# Patient Record
Sex: MALE | Race: WHITE | NOT HISPANIC OR LATINO | Employment: PART TIME | URBAN - METROPOLITAN AREA
[De-identification: names, ages, dates, MRNs, and addresses within clinical notes are randomized per-mention and may not be internally consistent; named-entity substitution may affect disease eponyms.]

---

## 2023-03-14 LAB
ALANINE AMINOTRANSFERASE (SGPT) (U/L) IN SER/PLAS: 34 U/L (ref 10–52)
ALBUMIN (G/DL) IN SER/PLAS: 4.2 G/DL (ref 3.4–5)
ALKALINE PHOSPHATASE (U/L) IN SER/PLAS: 68 U/L (ref 33–136)
ANION GAP IN SER/PLAS: 10 MMOL/L (ref 10–20)
ASPARTATE AMINOTRANSFERASE (SGOT) (U/L) IN SER/PLAS: 24 U/L (ref 9–39)
BASOPHILS (10*3/UL) IN BLOOD BY AUTOMATED COUNT: 0.04 X10E9/L (ref 0–0.1)
BASOPHILS/100 LEUKOCYTES IN BLOOD BY AUTOMATED COUNT: 0.6 % (ref 0–2)
BILIRUBIN TOTAL (MG/DL) IN SER/PLAS: 0.9 MG/DL (ref 0–1.2)
CALCIUM (MG/DL) IN SER/PLAS: 9 MG/DL (ref 8.6–10.3)
CARBON DIOXIDE, TOTAL (MMOL/L) IN SER/PLAS: 29 MMOL/L (ref 21–32)
CHLORIDE (MMOL/L) IN SER/PLAS: 99 MMOL/L (ref 98–107)
CREATININE (MG/DL) IN SER/PLAS: 1.05 MG/DL (ref 0.5–1.3)
EOSINOPHILS (10*3/UL) IN BLOOD BY AUTOMATED COUNT: 0.09 X10E9/L (ref 0–0.7)
EOSINOPHILS/100 LEUKOCYTES IN BLOOD BY AUTOMATED COUNT: 1.3 % (ref 0–6)
ERYTHROCYTE DISTRIBUTION WIDTH (RATIO) BY AUTOMATED COUNT: 14.4 % (ref 11.5–14.5)
ERYTHROCYTE MEAN CORPUSCULAR HEMOGLOBIN CONCENTRATION (G/DL) BY AUTOMATED: 31.3 G/DL (ref 32–36)
ERYTHROCYTE MEAN CORPUSCULAR VOLUME (FL) BY AUTOMATED COUNT: 96 FL (ref 80–100)
ERYTHROCYTES (10*6/UL) IN BLOOD BY AUTOMATED COUNT: 3.78 X10E12/L (ref 4.5–5.9)
GFR MALE: 78 ML/MIN/1.73M2
GLUCOSE (MG/DL) IN SER/PLAS: 93 MG/DL (ref 74–99)
HEMATOCRIT (%) IN BLOOD BY AUTOMATED COUNT: 36.4 % (ref 41–52)
HEMOGLOBIN (G/DL) IN BLOOD: 11.4 G/DL (ref 13.5–17.5)
IMMATURE GRANULOCYTES/100 LEUKOCYTES IN BLOOD BY AUTOMATED COUNT: 0.4 % (ref 0–0.9)
LEUKOCYTES (10*3/UL) IN BLOOD BY AUTOMATED COUNT: 7 X10E9/L (ref 4.4–11.3)
LYMPHOCYTES (10*3/UL) IN BLOOD BY AUTOMATED COUNT: 1.35 X10E9/L (ref 1.2–4.8)
LYMPHOCYTES/100 LEUKOCYTES IN BLOOD BY AUTOMATED COUNT: 19.4 % (ref 13–44)
MONOCYTES (10*3/UL) IN BLOOD BY AUTOMATED COUNT: 0.74 X10E9/L (ref 0.1–1)
MONOCYTES/100 LEUKOCYTES IN BLOOD BY AUTOMATED COUNT: 10.6 % (ref 2–10)
NEUTROPHILS (10*3/UL) IN BLOOD BY AUTOMATED COUNT: 4.72 X10E9/L (ref 1.2–7.7)
NEUTROPHILS/100 LEUKOCYTES IN BLOOD BY AUTOMATED COUNT: 67.7 % (ref 40–80)
PLATELETS (10*3/UL) IN BLOOD AUTOMATED COUNT: 213 X10E9/L (ref 150–450)
POTASSIUM (MMOL/L) IN SER/PLAS: 4.7 MMOL/L (ref 3.5–5.3)
PROTEIN TOTAL: 6.9 G/DL (ref 6.4–8.2)
SODIUM (MMOL/L) IN SER/PLAS: 133 MMOL/L (ref 136–145)
UREA NITROGEN (MG/DL) IN SER/PLAS: 19 MG/DL (ref 6–23)

## 2023-04-14 DIAGNOSIS — I10 PRIMARY HYPERTENSION: Primary | ICD-10-CM

## 2023-04-14 RX ORDER — LISINOPRIL 20 MG/1
20 TABLET ORAL DAILY
Qty: 90 TABLET | Refills: 0 | Status: SHIPPED | OUTPATIENT
Start: 2023-04-14

## 2023-04-14 RX ORDER — LISINOPRIL 20 MG/1
1 TABLET ORAL DAILY
COMMUNITY
Start: 2018-10-18 | End: 2023-04-14 | Stop reason: SDUPTHER

## 2023-06-23 LAB
ALANINE AMINOTRANSFERASE (SGPT) (U/L) IN SER/PLAS: 23 U/L (ref 10–52)
ALBUMIN (G/DL) IN SER/PLAS: 4.3 G/DL (ref 3.4–5)
ALKALINE PHOSPHATASE (U/L) IN SER/PLAS: 60 U/L (ref 33–136)
ANION GAP IN SER/PLAS: 12 MMOL/L (ref 10–20)
ASPARTATE AMINOTRANSFERASE (SGOT) (U/L) IN SER/PLAS: 23 U/L (ref 9–39)
BASOPHILS (10*3/UL) IN BLOOD BY AUTOMATED COUNT: 0.05 X10E9/L (ref 0–0.1)
BASOPHILS/100 LEUKOCYTES IN BLOOD BY AUTOMATED COUNT: 0.7 % (ref 0–2)
BILIRUBIN TOTAL (MG/DL) IN SER/PLAS: 0.9 MG/DL (ref 0–1.2)
CALCIUM (MG/DL) IN SER/PLAS: 8.9 MG/DL (ref 8.6–10.3)
CARBON DIOXIDE, TOTAL (MMOL/L) IN SER/PLAS: 24 MMOL/L (ref 21–32)
CHLORIDE (MMOL/L) IN SER/PLAS: 102 MMOL/L (ref 98–107)
CREATININE (MG/DL) IN SER/PLAS: 0.92 MG/DL (ref 0.5–1.3)
EOSINOPHILS (10*3/UL) IN BLOOD BY AUTOMATED COUNT: 0.16 X10E9/L (ref 0–0.7)
EOSINOPHILS/100 LEUKOCYTES IN BLOOD BY AUTOMATED COUNT: 2.4 % (ref 0–6)
ERYTHROCYTE DISTRIBUTION WIDTH (RATIO) BY AUTOMATED COUNT: 13.6 % (ref 11.5–14.5)
ERYTHROCYTE MEAN CORPUSCULAR HEMOGLOBIN CONCENTRATION (G/DL) BY AUTOMATED: 31 G/DL (ref 32–36)
ERYTHROCYTE MEAN CORPUSCULAR VOLUME (FL) BY AUTOMATED COUNT: 97 FL (ref 80–100)
ERYTHROCYTES (10*6/UL) IN BLOOD BY AUTOMATED COUNT: 3.78 X10E12/L (ref 4.5–5.9)
GFR MALE: >90 ML/MIN/1.73M2
GLUCOSE (MG/DL) IN SER/PLAS: 87 MG/DL (ref 74–99)
HEMATOCRIT (%) IN BLOOD BY AUTOMATED COUNT: 36.8 % (ref 41–52)
HEMOGLOBIN (G/DL) IN BLOOD: 11.4 G/DL (ref 13.5–17.5)
IMMATURE GRANULOCYTES/100 LEUKOCYTES IN BLOOD BY AUTOMATED COUNT: 0.3 % (ref 0–0.9)
LEUKOCYTES (10*3/UL) IN BLOOD BY AUTOMATED COUNT: 6.7 X10E9/L (ref 4.4–11.3)
LYMPHOCYTES (10*3/UL) IN BLOOD BY AUTOMATED COUNT: 1.47 X10E9/L (ref 1.2–4.8)
LYMPHOCYTES/100 LEUKOCYTES IN BLOOD BY AUTOMATED COUNT: 21.9 % (ref 13–44)
MONOCYTES (10*3/UL) IN BLOOD BY AUTOMATED COUNT: 0.64 X10E9/L (ref 0.1–1)
MONOCYTES/100 LEUKOCYTES IN BLOOD BY AUTOMATED COUNT: 9.6 % (ref 2–10)
NEUTROPHILS (10*3/UL) IN BLOOD BY AUTOMATED COUNT: 4.36 X10E9/L (ref 1.2–7.7)
NEUTROPHILS/100 LEUKOCYTES IN BLOOD BY AUTOMATED COUNT: 65.1 % (ref 40–80)
PLATELETS (10*3/UL) IN BLOOD AUTOMATED COUNT: 201 X10E9/L (ref 150–450)
POTASSIUM (MMOL/L) IN SER/PLAS: 4.3 MMOL/L (ref 3.5–5.3)
PROTEIN TOTAL: 6.9 G/DL (ref 6.4–8.2)
SODIUM (MMOL/L) IN SER/PLAS: 134 MMOL/L (ref 136–145)
UREA NITROGEN (MG/DL) IN SER/PLAS: 20 MG/DL (ref 6–23)

## 2023-07-07 DIAGNOSIS — J30.9 ALLERGIC RHINITIS, UNSPECIFIED SEASONALITY, UNSPECIFIED TRIGGER: Primary | ICD-10-CM

## 2023-07-07 RX ORDER — FLUTICASONE PROPIONATE 50 MCG
1 SPRAY, SUSPENSION (ML) NASAL 2 TIMES DAILY
COMMUNITY
End: 2023-07-07 | Stop reason: SDUPTHER

## 2023-07-07 RX ORDER — FLUTICASONE PROPIONATE 50 MCG
1 SPRAY, SUSPENSION (ML) NASAL 2 TIMES DAILY
Qty: 16 G | Refills: 2 | Status: SHIPPED | OUTPATIENT
Start: 2023-07-07

## 2023-08-20 PROBLEM — G25.0 ESSENTIAL TREMOR: Status: ACTIVE | Noted: 2023-08-20

## 2023-08-20 PROBLEM — H25.13 NUCLEAR SCLEROSIS OF BOTH EYES: Status: ACTIVE | Noted: 2023-08-20

## 2023-08-20 PROBLEM — S81.819A LEG LACERATION: Status: ACTIVE | Noted: 2023-08-20

## 2023-08-20 PROBLEM — H04.123 DRY EYE SYNDROME OF BOTH EYES: Status: ACTIVE | Noted: 2023-08-20

## 2023-08-20 PROBLEM — H17.9 CORNEAL SCAR, LEFT EYE: Status: ACTIVE | Noted: 2023-08-20

## 2023-08-20 PROBLEM — I71.20 THORACIC AORTIC ANEURYSM WITHOUT RUPTURE (CMS-HCC): Status: ACTIVE | Noted: 2023-08-20

## 2023-08-20 PROBLEM — L98.9 DISORDER OF SKIN AND SUBCUTANEOUS TISSUE: Status: ACTIVE | Noted: 2023-08-20

## 2023-08-20 PROBLEM — M79.89 LEG SWELLING: Status: ACTIVE | Noted: 2023-08-20

## 2023-08-20 PROBLEM — H11.823 CONJUNCTIVOCHALASIS OF BOTH EYES: Status: ACTIVE | Noted: 2023-08-20

## 2023-08-20 PROBLEM — H93.13 TINNITUS OF BOTH EARS: Status: ACTIVE | Noted: 2023-08-20

## 2023-08-20 PROBLEM — H35.363 DRUSEN STAGE MACULAR DEGENERATION OF BOTH EYES: Status: ACTIVE | Noted: 2023-08-20

## 2023-08-20 PROBLEM — H01.009 BLEPHARITIS OF BOTH UPPER AND LOWER EYELID: Status: ACTIVE | Noted: 2023-08-20

## 2023-08-20 PROBLEM — L90.5 SCAR: Status: ACTIVE | Noted: 2023-08-20

## 2023-08-20 PROBLEM — U07.1 COVID-19: Status: ACTIVE | Noted: 2023-08-20

## 2023-08-20 PROBLEM — R35.0 URINARY FREQUENCY: Status: ACTIVE | Noted: 2023-08-20

## 2023-08-20 PROBLEM — S81.801A TRAUMATIC OPEN WOUND OF RIGHT LOWER LEG: Status: ACTIVE | Noted: 2023-08-20

## 2023-08-20 PROBLEM — N13.8 BPH WITH OBSTRUCTION/LOWER URINARY TRACT SYMPTOMS: Status: ACTIVE | Noted: 2023-08-20

## 2023-08-20 PROBLEM — R91.1 LUNG NODULE: Status: ACTIVE | Noted: 2023-08-20

## 2023-08-20 PROBLEM — N40.1 BPH WITH OBSTRUCTION/LOWER URINARY TRACT SYMPTOMS: Status: ACTIVE | Noted: 2023-08-20

## 2023-08-20 PROBLEM — L12.1: Status: ACTIVE | Noted: 2023-08-20

## 2023-08-20 PROBLEM — D64.9 ANEMIA: Status: ACTIVE | Noted: 2023-08-20

## 2023-08-20 PROBLEM — R30.0 DYSURIA: Status: ACTIVE | Noted: 2023-08-20

## 2023-08-20 PROBLEM — H35.371 EPIRETINAL MEMBRANE (ERM) OF RIGHT EYE: Status: ACTIVE | Noted: 2023-08-20

## 2023-08-20 PROBLEM — E78.5 HYPERLIPIDEMIA: Status: ACTIVE | Noted: 2023-08-20

## 2023-08-20 PROBLEM — D35.00 ADRENAL ADENOMA: Status: ACTIVE | Noted: 2023-08-20

## 2023-08-20 PROBLEM — I10 HYPERTENSION: Status: ACTIVE | Noted: 2023-08-20

## 2023-08-20 PROBLEM — I25.10 ARTERIOSCLEROSIS OF CORONARY ARTERY: Status: ACTIVE | Noted: 2023-08-20

## 2023-08-20 PROBLEM — R93.1 AGATSTON CORONARY ARTERY CALCIUM SCORE GREATER THAN 400: Status: ACTIVE | Noted: 2023-08-20
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
## 2023-08-20 PROBLEM — H93.90 EAR LESION: Status: ACTIVE | Noted: 2023-08-20

## 2023-08-20 PROBLEM — D12.6 TUBULAR ADENOMA OF COLON: Status: ACTIVE | Noted: 2023-08-20

## 2023-08-20 PROBLEM — N41.9 PROSTATITIS: Status: ACTIVE | Noted: 2023-08-20

## 2023-08-20 PROBLEM — N52.9 ED (ERECTILE DYSFUNCTION): Status: ACTIVE | Noted: 2023-08-20

## 2023-08-20 RX ORDER — NIRMATRELVIR AND RITONAVIR 300-100 MG
KIT ORAL
COMMUNITY
Start: 2023-02-02 | End: 2023-10-20 | Stop reason: ALTCHOICE

## 2023-08-20 RX ORDER — MULTIVITAMIN
TABLET ORAL
COMMUNITY
Start: 2013-12-10

## 2023-08-20 RX ORDER — BETAMETHASONE DIPROPIONATE 0.5 MG/G
CREAM TOPICAL
COMMUNITY
Start: 2022-11-21

## 2023-08-20 RX ORDER — TADALAFIL 20 MG/1
20 TABLET ORAL
COMMUNITY
Start: 2023-01-19

## 2023-08-20 RX ORDER — PROPRANOLOL HYDROCHLORIDE 20 MG/1
TABLET ORAL
COMMUNITY
Start: 2022-06-27 | End: 2023-10-02 | Stop reason: SDUPTHER

## 2023-08-20 RX ORDER — CLINDAMYCIN PHOSPHATE 10 UG/ML
LOTION TOPICAL
COMMUNITY
Start: 2022-09-09

## 2023-08-20 RX ORDER — CIPROFLOXACIN 500 MG/1
500 TABLET ORAL EVERY 12 HOURS
COMMUNITY
Start: 2022-11-02 | End: 2023-10-20 | Stop reason: ALTCHOICE

## 2023-08-20 RX ORDER — CLOBETASOL PROPIONATE 0.05 MG/G
GEL TOPICAL
COMMUNITY
Start: 2023-07-06

## 2023-08-20 RX ORDER — ATORVASTATIN CALCIUM 40 MG/1
1 TABLET, FILM COATED ORAL NIGHTLY
COMMUNITY
Start: 2018-02-01 | End: 2024-05-31 | Stop reason: SDUPTHER

## 2023-08-20 RX ORDER — TRIAMCINOLONE ACETONIDE 1 MG/G
OINTMENT TOPICAL
COMMUNITY
Start: 2023-01-03

## 2023-08-20 RX ORDER — DAPSONE 100 MG/1
1 TABLET ORAL DAILY
COMMUNITY
Start: 2022-06-01

## 2023-08-20 RX ORDER — CHLORHEXIDINE GLUCONATE ORAL RINSE 1.2 MG/ML
SOLUTION DENTAL
COMMUNITY
Start: 2023-01-03

## 2023-08-20 RX ORDER — SILDENAFIL CITRATE 20 MG/1
TABLET ORAL
COMMUNITY
Start: 2021-07-07 | End: 2023-10-20 | Stop reason: ALTCHOICE

## 2023-08-20 RX ORDER — CHLORHEXIDINE GLUCONATE 4 G/100ML
SOLUTION TOPICAL
COMMUNITY
Start: 2023-01-03

## 2023-08-20 RX ORDER — TADALAFIL 5 MG/1
1 TABLET ORAL DAILY
COMMUNITY
Start: 2023-01-04 | End: 2023-10-20 | Stop reason: SDUPTHER

## 2023-08-20 RX ORDER — DAPSONE 25 MG/1
3 TABLET ORAL DAILY
COMMUNITY
Start: 2023-01-31

## 2023-09-25 LAB
ALANINE AMINOTRANSFERASE (SGPT) (U/L) IN SER/PLAS: 17 U/L (ref 10–52)
ALBUMIN (G/DL) IN SER/PLAS: 4 G/DL (ref 3.4–5)
ALKALINE PHOSPHATASE (U/L) IN SER/PLAS: 55 U/L (ref 33–136)
ANION GAP IN SER/PLAS: 10 MMOL/L (ref 10–20)
ASPARTATE AMINOTRANSFERASE (SGOT) (U/L) IN SER/PLAS: 20 U/L (ref 9–39)
BASOPHILS (10*3/UL) IN BLOOD BY AUTOMATED COUNT: 0.06 X10E9/L (ref 0–0.1)
BASOPHILS/100 LEUKOCYTES IN BLOOD BY AUTOMATED COUNT: 0.9 % (ref 0–2)
BILIRUBIN TOTAL (MG/DL) IN SER/PLAS: 0.9 MG/DL (ref 0–1.2)
CALCIUM (MG/DL) IN SER/PLAS: 8.8 MG/DL (ref 8.6–10.3)
CARBON DIOXIDE, TOTAL (MMOL/L) IN SER/PLAS: 29 MMOL/L (ref 21–32)
CHLORIDE (MMOL/L) IN SER/PLAS: 99 MMOL/L (ref 98–107)
CREATININE (MG/DL) IN SER/PLAS: 1.02 MG/DL (ref 0.5–1.3)
EOSINOPHILS (10*3/UL) IN BLOOD BY AUTOMATED COUNT: 0.16 X10E9/L (ref 0–0.7)
EOSINOPHILS/100 LEUKOCYTES IN BLOOD BY AUTOMATED COUNT: 2.3 % (ref 0–6)
ERYTHROCYTE DISTRIBUTION WIDTH (RATIO) BY AUTOMATED COUNT: 13 % (ref 11.5–14.5)
ERYTHROCYTE MEAN CORPUSCULAR HEMOGLOBIN CONCENTRATION (G/DL) BY AUTOMATED: 32.1 G/DL (ref 32–36)
ERYTHROCYTE MEAN CORPUSCULAR VOLUME (FL) BY AUTOMATED COUNT: 97 FL (ref 80–100)
ERYTHROCYTES (10*6/UL) IN BLOOD BY AUTOMATED COUNT: 3.73 X10E12/L (ref 4.5–5.9)
GFR MALE: 81 ML/MIN/1.73M2
GLUCOSE (MG/DL) IN SER/PLAS: 119 MG/DL (ref 74–99)
HEMATOCRIT (%) IN BLOOD BY AUTOMATED COUNT: 36.1 % (ref 41–52)
HEMOGLOBIN (G/DL) IN BLOOD: 11.6 G/DL (ref 13.5–17.5)
IMMATURE GRANULOCYTES/100 LEUKOCYTES IN BLOOD BY AUTOMATED COUNT: 0.3 % (ref 0–0.9)
LEUKOCYTES (10*3/UL) IN BLOOD BY AUTOMATED COUNT: 6.9 X10E9/L (ref 4.4–11.3)
LYMPHOCYTES (10*3/UL) IN BLOOD BY AUTOMATED COUNT: 1.23 X10E9/L (ref 1.2–4.8)
LYMPHOCYTES/100 LEUKOCYTES IN BLOOD BY AUTOMATED COUNT: 17.9 % (ref 13–44)
MONOCYTES (10*3/UL) IN BLOOD BY AUTOMATED COUNT: 0.45 X10E9/L (ref 0.1–1)
MONOCYTES/100 LEUKOCYTES IN BLOOD BY AUTOMATED COUNT: 6.5 % (ref 2–10)
NEUTROPHILS (10*3/UL) IN BLOOD BY AUTOMATED COUNT: 4.96 X10E9/L (ref 1.2–7.7)
NEUTROPHILS/100 LEUKOCYTES IN BLOOD BY AUTOMATED COUNT: 72.1 % (ref 40–80)
PLATELETS (10*3/UL) IN BLOOD AUTOMATED COUNT: 205 X10E9/L (ref 150–450)
POTASSIUM (MMOL/L) IN SER/PLAS: 4.6 MMOL/L (ref 3.5–5.3)
PROSTATE SPECIFIC AG (NG/ML) IN SER/PLAS: 1.1 NG/ML (ref 0–4)
PROTEIN TOTAL: 6.5 G/DL (ref 6.4–8.2)
SODIUM (MMOL/L) IN SER/PLAS: 133 MMOL/L (ref 136–145)
UREA NITROGEN (MG/DL) IN SER/PLAS: 24 MG/DL (ref 6–23)

## 2023-10-02 ENCOUNTER — OFFICE VISIT (OUTPATIENT)
Dept: NEUROLOGY | Facility: CLINIC | Age: 68
End: 2023-10-02
Payer: MEDICARE

## 2023-10-02 VITALS
HEIGHT: 72 IN | BODY MASS INDEX: 28.31 KG/M2 | SYSTOLIC BLOOD PRESSURE: 128 MMHG | DIASTOLIC BLOOD PRESSURE: 74 MMHG | WEIGHT: 209 LBS

## 2023-10-02 DIAGNOSIS — G25.0 ESSENTIAL TREMOR: Primary | ICD-10-CM

## 2023-10-02 DIAGNOSIS — Z79.899 OTHER LONG TERM (CURRENT) DRUG THERAPY: ICD-10-CM

## 2023-10-02 DIAGNOSIS — L12.1 CICATRICIAL PEMPHIGOID (MULTI): Primary | ICD-10-CM

## 2023-10-02 PROCEDURE — 3074F SYST BP LT 130 MM HG: CPT | Performed by: PSYCHIATRY & NEUROLOGY

## 2023-10-02 PROCEDURE — 99213 OFFICE O/P EST LOW 20 MIN: CPT | Performed by: PSYCHIATRY & NEUROLOGY

## 2023-10-02 PROCEDURE — 1160F RVW MEDS BY RX/DR IN RCRD: CPT | Performed by: PSYCHIATRY & NEUROLOGY

## 2023-10-02 PROCEDURE — 1159F MED LIST DOCD IN RCRD: CPT | Performed by: PSYCHIATRY & NEUROLOGY

## 2023-10-02 PROCEDURE — 1126F AMNT PAIN NOTED NONE PRSNT: CPT | Performed by: PSYCHIATRY & NEUROLOGY

## 2023-10-02 PROCEDURE — 3078F DIAST BP <80 MM HG: CPT | Performed by: PSYCHIATRY & NEUROLOGY

## 2023-10-02 RX ORDER — PROPRANOLOL HYDROCHLORIDE 20 MG/1
40 TABLET ORAL 2 TIMES DAILY
Qty: 120 TABLET | Refills: 1 | Status: SHIPPED | OUTPATIENT
Start: 2023-10-02 | End: 2023-12-27 | Stop reason: SDUPTHER

## 2023-10-02 ASSESSMENT — FAHN TOLOSA MARTIN TREMOR (FTM)
TOUNGE AT REST: 0
LLE TOTAL SCORE: 0
UE FINGER TO NOSE AND OTHER ACTIONS: 1
LE TOE TO FINGER IN A FLEXED POSTURE: 0
LE AT REST: 0
DRAWING A RIGHT SCORE: 1
DRAWING C TOTAL SCORE: 3
TOUNGE TOTAL SCORE: 1
RLE TOTAL SCORE: 0
LE LEG FLEXED AT HIPS AND KNEES AT POSTURE: 0
LE AT REST: 0
LE LEG FLEXED AT HIPS AND KNEES AT POSTURE: 0
UE ARM AT REST: 0
UE ARMS OUTSTRECHED WRISTS MILDLY EXTENDED FINGERS SPREAD APART: 2
DRAWING C RIGHT SCORE: 1
DRAWING A TOTAL SCORE: 3
HANDWRITING WITH DOMINANT HAND: 1
DRAWING B RGHT SCORE: 2
VOICE TOTAL SCORE: 1
UE ARMS OUTSTRECHED WRISTS MILDLY EXTENDED FINGERS SPREAD APART: 0
RUE TOTAL SCORE: 1
FACE AT POSTURE WHEN STANDING OR SITTING: 0
UE FINGER TO NOSE AND OTHER ACTIONS: 2
FACE IN REPOSE: 0
TOUNGE WHEN PROTUDED: 1
UE ARM AT REST: 0
HEAD AT POSTURE WHEN STANDING OR SITTING: 0
RUE TOTAL SCORE: 4
DRAWING B TOTAL SCORE: 4
DRAWING C LEFT SCORE: 2
DRAWING A LEFT SCORE: 2
LE TOE TO FINGER IN A FLEXED POSTURE: 0
FACE TOTAL: 0
VOICE IN ACTION: 1
DRAWING B LEFT SCORE: 2

## 2023-10-02 ASSESSMENT — PAIN SCALES - GENERAL: PAINLEVEL: 0-NO PAIN

## 2023-10-02 NOTE — PATIENT INSTRUCTIONS
It was nice to see you today   You can take 60 mg of propranolol if needed for a performance, otherwise take 40 mg twice a day.   Side effects of propranolol include dizziness, reduced exercise tolerance, sleep disturbance.   Return to clinic in 9-12 months.

## 2023-10-02 NOTE — PROGRESS NOTES
Here for follow up    Subjective     Varghese Morales is a right handed  67 y.o. year old male who presents with Follow-up.   Visit type: follow up visit     HPI           Last seen a year ago. Was restarted on propranolol at that time.   For the most part her tremor is very good.   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   Mild shaking still present.   No side effects from the medications.   Overall he feels that he is doing good although he notes the propranolol was efficacious at lower doses in the past.      Relevant meds:   Propranolol 20 mg ta - two tabs twice a day   (Did take one extra tab when went on stage as descirbed above)     Review of Systems  All other system have been reviewed and are negative for complaint.  Objective   Neurological Exam    Physical Exam      Please see tremor rating scale   Mild kinetic temor L>R side.   NO parkinsonism, walks briskly, normal arm swing, normal facial mimickry and no hypophonia.                          Assessment/Plan   Diagnoses and all orders for this visit:  Essential tremor  -     propranolol (Inderal) 20 mg tablet; Take 2 tablets (40 mg) by mouth 2 times a day.    He is seen for follow up. His exam continues to show mild essential tremor.   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 (Max dose 60 mg per dose), NO side effects from current medications.   He was advised re side effects again.   He can return to clinic in 9-12 months.

## 2023-10-20 ENCOUNTER — OFFICE VISIT (OUTPATIENT)
Dept: UROLOGY | Facility: CLINIC | Age: 68
End: 2023-10-20
Payer: MEDICARE

## 2023-10-20 VITALS
WEIGHT: 222.2 LBS | DIASTOLIC BLOOD PRESSURE: 75 MMHG | SYSTOLIC BLOOD PRESSURE: 126 MMHG | BODY MASS INDEX: 30.14 KG/M2 | HEART RATE: 57 BPM | TEMPERATURE: 97.5 F

## 2023-10-20 DIAGNOSIS — N13.8 BPH WITH OBSTRUCTION/LOWER URINARY TRACT SYMPTOMS: ICD-10-CM

## 2023-10-20 DIAGNOSIS — N52.9 ERECTILE DISORDER: Primary | ICD-10-CM

## 2023-10-20 DIAGNOSIS — N40.1 BPH WITH OBSTRUCTION/LOWER URINARY TRACT SYMPTOMS: ICD-10-CM

## 2023-10-20 PROCEDURE — 99213 OFFICE O/P EST LOW 20 MIN: CPT | Performed by: NURSE PRACTITIONER

## 2023-10-20 PROCEDURE — 3074F SYST BP LT 130 MM HG: CPT | Performed by: NURSE PRACTITIONER

## 2023-10-20 PROCEDURE — 3078F DIAST BP <80 MM HG: CPT | Performed by: NURSE PRACTITIONER

## 2023-10-20 PROCEDURE — 1126F AMNT PAIN NOTED NONE PRSNT: CPT | Performed by: NURSE PRACTITIONER

## 2023-10-20 PROCEDURE — 1160F RVW MEDS BY RX/DR IN RCRD: CPT | Performed by: NURSE PRACTITIONER

## 2023-10-20 PROCEDURE — 1159F MED LIST DOCD IN RCRD: CPT | Performed by: NURSE PRACTITIONER

## 2023-10-20 PROCEDURE — 1036F TOBACCO NON-USER: CPT | Performed by: NURSE PRACTITIONER

## 2023-10-20 RX ORDER — TADALAFIL 5 MG/1
5 TABLET ORAL DAILY
Qty: 90 TABLET | Refills: 1 | Status: SHIPPED | OUTPATIENT
Start: 2023-10-20 | End: 2024-06-03 | Stop reason: WASHOUT

## 2023-10-20 NOTE — PROGRESS NOTES
Subjective   Patient ID: Varghese Morales is a 67 y.o. male who presents for PSA FOLLOW UP.  Presents for f/u of BPH and ED. He notes he feels this distally within the penis, mild irritation. He states this has become noticeable over the last few months.      PSA 1.25 in August 2022. Denies family history of breast cancer. Mother with breast cancer, sister with uterine cancer.      Taking tadalafil 20mg PO QOD. Currently taking tadalafil 20mg PO QOD. NTF down to 1-2 from 3-4. Urination has been easier with this also. He is pleased. No further urinary irritation. Still somewhat bothered by nocturia.      Increased difficulty with achieving orgasms. Happens with each attempt at orgasm. No partner at present.           Review of Systems   All other systems reviewed and are negative.      Objective   Physical Exam  Vitals reviewed.     Alert and oriented x3  Moist mucous membranes  Breathes easily on room air  Abdomen soft, nondistended. Obese  No edema  No scleral icterus  No focal neurological deficits  Appears stated age, no acute distress    Lab Results   Component Value Date    PSA 1.10 09/25/2023    PSA 1.40 07/27/2021    PSA 1.13 02/07/2020         Assessment/Plan   Diagnoses and all orders for this visit:  Erectile disorder  -     tadalafil (Cialis) 5 mg tablet; Take 1 tablet (5 mg) by mouth once daily.  BPH with obstruction/lower urinary tract symptoms  -     tadalafil (Cialis) 5 mg tablet; Take 1 tablet (5 mg) by mouth once daily.       Encouraged use of manual vacuum erection device. Reviewed PSA. Encouraged continued annual follow up

## 2023-10-27 ENCOUNTER — APPOINTMENT (OUTPATIENT)
Dept: UROLOGY | Facility: CLINIC | Age: 68
End: 2023-10-27
Payer: COMMERCIAL

## 2023-11-20 ENCOUNTER — TELEPHONE (OUTPATIENT)
Dept: PRIMARY CARE | Facility: CLINIC | Age: 68
End: 2023-11-20
Payer: MEDICARE

## 2023-11-20 NOTE — TELEPHONE ENCOUNTER
Patient is traveling tomorrow to his daughters house and she is on day 8 since she got COVID. She had a very mild case. Is this ok to go see her? Is she still contagious?

## 2023-12-27 ENCOUNTER — TELEPHONE (OUTPATIENT)
Dept: NEUROLOGY | Facility: CLINIC | Age: 68
End: 2023-12-27
Payer: MEDICARE

## 2023-12-27 DIAGNOSIS — G25.0 ESSENTIAL TREMOR: ICD-10-CM

## 2023-12-27 RX ORDER — PROPRANOLOL HYDROCHLORIDE 20 MG/1
40 TABLET ORAL 2 TIMES DAILY
Qty: 360 TABLET | Refills: 3 | Status: SHIPPED | OUTPATIENT
Start: 2023-12-27 | End: 2024-06-03 | Stop reason: SDUPTHER

## 2024-01-04 ENCOUNTER — LAB (OUTPATIENT)
Dept: LAB | Facility: LAB | Age: 69
End: 2024-01-04
Payer: MEDICARE

## 2024-01-04 DIAGNOSIS — L12.1 CICATRICIAL PEMPHIGOID (MULTI): ICD-10-CM

## 2024-01-04 DIAGNOSIS — Z79.899 OTHER LONG TERM (CURRENT) DRUG THERAPY: ICD-10-CM

## 2024-01-04 LAB
ALBUMIN SERPL BCP-MCNC: 4 G/DL (ref 3.4–5)
ALP SERPL-CCNC: 63 U/L (ref 33–136)
ALT SERPL W P-5'-P-CCNC: 21 U/L (ref 10–52)
ANION GAP SERPL CALC-SCNC: 9 MMOL/L (ref 10–20)
AST SERPL W P-5'-P-CCNC: 18 U/L (ref 9–39)
BASOPHILS # BLD AUTO: 0.04 X10*3/UL (ref 0–0.1)
BASOPHILS NFR BLD AUTO: 0.6 %
BILIRUB SERPL-MCNC: 0.8 MG/DL (ref 0–1.2)
BUN SERPL-MCNC: 21 MG/DL (ref 6–23)
CALCIUM SERPL-MCNC: 8.6 MG/DL (ref 8.6–10.3)
CHLORIDE SERPL-SCNC: 102 MMOL/L (ref 98–107)
CO2 SERPL-SCNC: 27 MMOL/L (ref 21–32)
CREAT SERPL-MCNC: 0.97 MG/DL (ref 0.5–1.3)
EOSINOPHIL # BLD AUTO: 0.13 X10*3/UL (ref 0–0.7)
EOSINOPHIL NFR BLD AUTO: 1.9 %
ERYTHROCYTE [DISTWIDTH] IN BLOOD BY AUTOMATED COUNT: 13.5 % (ref 11.5–14.5)
GFR SERPL CREATININE-BSD FRML MDRD: 86 ML/MIN/1.73M*2
GLUCOSE SERPL-MCNC: 110 MG/DL (ref 74–99)
HCT VFR BLD AUTO: 38.2 % (ref 41–52)
HGB BLD-MCNC: 12.2 G/DL (ref 13.5–17.5)
IMM GRANULOCYTES # BLD AUTO: 0.03 X10*3/UL (ref 0–0.7)
IMM GRANULOCYTES NFR BLD AUTO: 0.4 % (ref 0–0.9)
LYMPHOCYTES # BLD AUTO: 1.5 X10*3/UL (ref 1.2–4.8)
LYMPHOCYTES NFR BLD AUTO: 21.8 %
MCH RBC QN AUTO: 31 PG (ref 26–34)
MCHC RBC AUTO-ENTMCNC: 31.9 G/DL (ref 32–36)
MCV RBC AUTO: 97 FL (ref 80–100)
MONOCYTES # BLD AUTO: 0.64 X10*3/UL (ref 0.1–1)
MONOCYTES NFR BLD AUTO: 9.3 %
NEUTROPHILS # BLD AUTO: 4.55 X10*3/UL (ref 1.2–7.7)
NEUTROPHILS NFR BLD AUTO: 66 %
NRBC BLD-RTO: 0 /100 WBCS (ref 0–0)
PLATELET # BLD AUTO: 197 X10*3/UL (ref 150–450)
POTASSIUM SERPL-SCNC: 4 MMOL/L (ref 3.5–5.3)
PROT SERPL-MCNC: 6.8 G/DL (ref 6.4–8.2)
RBC # BLD AUTO: 3.93 X10*6/UL (ref 4.5–5.9)
SODIUM SERPL-SCNC: 134 MMOL/L (ref 136–145)
WBC # BLD AUTO: 6.9 X10*3/UL (ref 4.4–11.3)

## 2024-01-04 PROCEDURE — 36415 COLL VENOUS BLD VENIPUNCTURE: CPT

## 2024-01-04 PROCEDURE — 85025 COMPLETE CBC W/AUTO DIFF WBC: CPT

## 2024-01-04 PROCEDURE — 80053 COMPREHEN METABOLIC PANEL: CPT

## 2024-01-08 ENCOUNTER — APPOINTMENT (OUTPATIENT)
Dept: PRIMARY CARE | Facility: CLINIC | Age: 69
End: 2024-01-08
Payer: MEDICARE

## 2024-01-09 ENCOUNTER — APPOINTMENT (OUTPATIENT)
Dept: PRIMARY CARE | Facility: CLINIC | Age: 69
End: 2024-01-09
Payer: MEDICARE

## 2024-02-15 ENCOUNTER — TELEPHONE (OUTPATIENT)
Dept: PRIMARY CARE | Facility: CLINIC | Age: 69
End: 2024-02-15

## 2024-02-15 ENCOUNTER — OFFICE VISIT (OUTPATIENT)
Dept: PRIMARY CARE | Facility: CLINIC | Age: 69
End: 2024-02-15
Payer: MEDICARE

## 2024-02-15 ENCOUNTER — APPOINTMENT (OUTPATIENT)
Dept: PRIMARY CARE | Facility: CLINIC | Age: 69
End: 2024-02-15
Payer: MEDICARE

## 2024-02-15 VITALS
TEMPERATURE: 97.4 F | RESPIRATION RATE: 16 BRPM | SYSTOLIC BLOOD PRESSURE: 138 MMHG | HEART RATE: 103 BPM | WEIGHT: 211 LBS | OXYGEN SATURATION: 97 % | DIASTOLIC BLOOD PRESSURE: 80 MMHG | BODY MASS INDEX: 29.54 KG/M2 | HEIGHT: 71 IN

## 2024-02-15 DIAGNOSIS — Z12.5 PROSTATE CANCER SCREENING: ICD-10-CM

## 2024-02-15 DIAGNOSIS — I71.20 THORACIC AORTIC ANEURYSM WITHOUT RUPTURE, UNSPECIFIED PART (CMS-HCC): ICD-10-CM

## 2024-02-15 DIAGNOSIS — E78.2 MIXED HYPERLIPIDEMIA: Primary | ICD-10-CM

## 2024-02-15 DIAGNOSIS — K21.9 GASTROESOPHAGEAL REFLUX DISEASE WITHOUT ESOPHAGITIS: ICD-10-CM

## 2024-02-15 PROCEDURE — 1126F AMNT PAIN NOTED NONE PRSNT: CPT | Performed by: INTERNAL MEDICINE

## 2024-02-15 PROCEDURE — 3079F DIAST BP 80-89 MM HG: CPT | Performed by: INTERNAL MEDICINE

## 2024-02-15 PROCEDURE — 1160F RVW MEDS BY RX/DR IN RCRD: CPT | Performed by: INTERNAL MEDICINE

## 2024-02-15 PROCEDURE — 1036F TOBACCO NON-USER: CPT | Performed by: INTERNAL MEDICINE

## 2024-02-15 PROCEDURE — 1159F MED LIST DOCD IN RCRD: CPT | Performed by: INTERNAL MEDICINE

## 2024-02-15 PROCEDURE — 99214 OFFICE O/P EST MOD 30 MIN: CPT | Performed by: INTERNAL MEDICINE

## 2024-02-15 PROCEDURE — 1123F ACP DISCUSS/DSCN MKR DOCD: CPT | Performed by: INTERNAL MEDICINE

## 2024-02-15 PROCEDURE — G0439 PPPS, SUBSEQ VISIT: HCPCS | Performed by: INTERNAL MEDICINE

## 2024-02-15 PROCEDURE — 3075F SYST BP GE 130 - 139MM HG: CPT | Performed by: INTERNAL MEDICINE

## 2024-02-15 PROCEDURE — 1170F FXNL STATUS ASSESSED: CPT | Performed by: INTERNAL MEDICINE

## 2024-02-15 RX ORDER — OMEPRAZOLE 20 MG/1
20 CAPSULE, DELAYED RELEASE ORAL DAILY
Qty: 30 CAPSULE | Refills: 1 | Status: SHIPPED | OUTPATIENT
Start: 2024-02-15 | End: 2024-04-09 | Stop reason: SDUPTHER

## 2024-02-15 ASSESSMENT — ENCOUNTER SYMPTOMS
BLOOD IN STOOL: 0
TROUBLE SWALLOWING: 0
FREQUENCY: 0
CONSTIPATION: 0
ARTHRALGIAS: 0
DIARRHEA: 0
ROS GI COMMENTS: POSITIVE FOR HEARTBURN.
ABDOMINAL PAIN: 0

## 2024-02-15 ASSESSMENT — ACTIVITIES OF DAILY LIVING (ADL)
TAKING_MEDICATION: INDEPENDENT
GROCERY_SHOPPING: INDEPENDENT
MANAGING_FINANCES: INDEPENDENT
DOING_HOUSEWORK: INDEPENDENT
DRESSING: INDEPENDENT
BATHING: INDEPENDENT

## 2024-02-15 ASSESSMENT — PATIENT HEALTH QUESTIONNAIRE - PHQ9
2. FEELING DOWN, DEPRESSED OR HOPELESS: NOT AT ALL
1. LITTLE INTEREST OR PLEASURE IN DOING THINGS: NOT AT ALL
SUM OF ALL RESPONSES TO PHQ9 QUESTIONS 1 AND 2: 0

## 2024-02-15 NOTE — PROGRESS NOTES
Patient here for a medicare wellness visit and follow up    Subjective   Patient ID: Varghese Morales is a 68 y.o. male who presents for Medicare Annual Wellness Visit Subsequent and Follow-up.    The patient reports recurrent heartburn, particularly on laying down, that is controlled with two Mylanta on most days.  He denies any dysphagia.      The patient continues to follow with  from Cardiology for a stable thoracica aortic aneurysm and is monitored with surveillance.    The patient notes improvement in nocturia with tadalafil, and is following with Urology.  He denies other significant urinary symptoms.     The patient reports mild throat irritation that he suspects is due to the dry weather.     The patient mentions occasional tinnitus that is tolerable to date, and denies significant hearing impairment. He follows regularly with a Dentist, and reports good sleep quality.  The patient denies any abdominal pain, hematochezia, melena, bowel problems, or significant joint pain.     The patient is an  at a [employer] part-time.  He previously smoked when he was in his 20s, and maintains an active lifestyle with regular swimming.      Review of Systems   HENT:  Positive for tinnitus. Negative for hearing loss and trouble swallowing.    Gastrointestinal:  Negative for abdominal pain, blood in stool, constipation and diarrhea.        Positive for heartburn.   Genitourinary:  Negative for frequency.   Musculoskeletal:  Negative for arthralgias.       Objective   Physical Exam  Constitutional:       Appearance: Normal appearance.   Neck:      Vascular: No carotid bruit.   Cardiovascular:      Rate and Rhythm: Normal rate and regular rhythm.      Heart sounds: Normal heart sounds.   Pulmonary:      Effort: Pulmonary effort is normal.      Breath sounds: Normal breath sounds.   Abdominal:      General: Bowel sounds are normal.      Palpations: Abdomen is soft.      Tenderness: There is no  abdominal tenderness.   Skin:     General: Skin is warm and dry.   Neurological:      General: No focal deficit present.      Mental Status: He is alert and oriented to person, place, and time. Mental status is at baseline.   Psychiatric:         Mood and Affect: Mood normal.         Behavior: Behavior normal.       Assessment/Plan   Problem List Items Addressed This Visit             ICD-10-CM    Hyperlipidemia - Primary E78.5    Relevant Orders    Lipid panel    Comprehensive metabolic panel    CBC and Auto Differential    Thoracic aortic aneurysm without rupture (CMS/HCC) I71.20     Other Visit Diagnoses         Codes    Prostate cancer screening     Z12.5    Relevant Orders    Prostate Spec.Ag,Screen    Gastroesophageal reflux disease without esophagitis     K21.9    Relevant Medications    omeprazole (PriLOSEC) 20 mg DR capsule            Medicare Wellness Examination Done  -  Discussed healthy diet and regular exercise.    -  Physical exam overall unremarkable. Immunizations reviewed and updated accordingly. Healthy lifestyle choices discussed (tobacco avoidance, appropriate alcohol use, avoidance of illicit substances).   -  Patient is wearing seatbelt.   -  Screening lab work ordered as indicated.    -  Age appropriate screening tests reviewed with patient.       IMPRESSION/PLAN:     BPH/ED   - Following with urology.     GERD  - Prescribed omeprazole 20mg once daily.  Monitor for now.  Call the clinic if symptoms persist or worsen.    HTN   - /80 in the office today.  Continue taking lisinopril 20 mg QD. Follows with Dr. Moura in Cardiology.      HLD   - Stable. Takes Atorvastatin 40 mg QD.      Lung Nodule  - Last CT Chest 10/2021 showed Stable bilateral nodular opacities. No new pulmonary nodules  identified. No acute cardiopulmonary process.     Essential Tremor   - Continue Propranolol 40mg BID. Follows with Dr. Rea in Neurology.      Health Maintenance   - Routine labs ordered including  CBC, CMP, and a lipid panel to be completed in the fasting state. Added PSA. Last PSA wnl 2022. Last colonoscopy 5/2021, repeat due 5/2026.     Follow up in 6 months, call sooner if needed.        Scribe Attestation  By signing my name below, ISandy Scribe   attest that this documentation has been prepared under the direction and in the presence of Cristi Cesar DO.   Sandy Contreras 02/15/24 1:22 PM

## 2024-02-15 NOTE — TELEPHONE ENCOUNTER
I would continue to take it PAST SURGICAL HISTORY:  H/O nasal septoplasty 1981    Testicular torsion pt. can't recall which side-1978

## 2024-02-15 NOTE — TELEPHONE ENCOUNTER
Patient was traveling yesterday, and the airlines lost his bag, so he didn't take his BP medication. Today, his BP was 130/70. Should patient still continue to take his BP medication, or should he start to decrease the dosage? Please advise.

## 2024-03-22 ENCOUNTER — OFFICE VISIT (OUTPATIENT)
Dept: DERMATOLOGY | Facility: CLINIC | Age: 69
End: 2024-03-22
Payer: MEDICARE

## 2024-03-22 DIAGNOSIS — L82.1 SEBORRHEIC KERATOSIS: ICD-10-CM

## 2024-03-22 DIAGNOSIS — L12.1 MUCOUS MEMBRANE PEMPHIGOID (MULTI): Primary | ICD-10-CM

## 2024-03-22 PROCEDURE — 1036F TOBACCO NON-USER: CPT | Performed by: DERMATOLOGY

## 2024-03-22 PROCEDURE — 1159F MED LIST DOCD IN RCRD: CPT | Performed by: DERMATOLOGY

## 2024-03-22 PROCEDURE — 1160F RVW MEDS BY RX/DR IN RCRD: CPT | Performed by: DERMATOLOGY

## 2024-03-22 PROCEDURE — 1123F ACP DISCUSS/DSCN MKR DOCD: CPT | Performed by: DERMATOLOGY

## 2024-03-22 PROCEDURE — 99213 OFFICE O/P EST LOW 20 MIN: CPT | Performed by: DERMATOLOGY

## 2024-03-22 NOTE — PROGRESS NOTES
Subjective     Varghese Morales is a 68 y.o. male who presents for the following: Spot Check (Back- noticed a few months ago. previously treated with LN2 about 2 months ago) and MMP (Gums - not bothering pt, still using dapsone and clobetasol x1-2 a week, helping with symptoms. Pt has appt with Dr. Brown in April.).     MMP is clear on dapsone 100 mg daily, and cloebtasol 1-2 times a week with a mouth tray (rx'ed by Dr. Duncan).  Denies dry eye, eye irritation, vision changes, oral pain.     He reports he had a lesion on the back frozen by his outside dermatologist and it is still present and would like it reevaluated. Denies symptoms.     Review of Systems:  No other skin or systemic complaints other than what is documented elsewhere in the note.    The following portions of the chart were reviewed this encounter and updated as appropriate:       Specialty Problems          Dermatology Problems    Disorder of skin and subcutaneous tissue    Mucous membrane pemphigoid    Scar    Traumatic open wound of right lower leg     Past Medical History:  Varghese Morales  has a past medical history of Abdominal distension (gaseous) (03/22/2017), Encounter for removal of sutures (07/18/2021), Essential (primary) hypertension (10/31/2022), Pain in left leg (07/27/2021), Personal history of diseases of the skin and subcutaneous tissue (07/18/2021), Personal history of other diseases of the nervous system and sense organs (05/29/2015), Personal history of other diseases of the nervous system and sense organs (05/29/2015), and Strain of other muscle(s) and tendon(s) at lower leg level, right leg, initial encounter (07/27/2016).    Past Surgical History:  Varghese Morales  has a past surgical history that includes Colonoscopy (12/05/2012).    Family History:  Patient family history includes Cancer in an other family member; Coronary artery disease in his father; Diabetes in an other family member; Hypertension in an other family member;  suicide completion in his brother.    Social History:  Varghese Morales  reports that he has quit smoking. His smoking use included cigarettes. He has never used smokeless tobacco. He reports current alcohol use. He reports that he does not use drugs.    Allergies:  Other    Current Medications / CAM's:    Current Outpatient Medications:     atorvastatin (Lipitor) 40 mg tablet, Take 1 tablet (40 mg) by mouth once daily at bedtime., Disp: , Rfl:     betamethasone, augmented, (Diprolene AF) 0.05 % cream, Take 1apply to affected areas twice a day, Disp: , Rfl:     chlorhexidine (Peridex) 0.12 % solution, RINSE MOUTH WITH 15 ML'S FOR 30 SECONDS IN THE MORNING AND IN THE EVENING AFTER TOOTHBRUSHING.EXPECTORATE AFTER RINSING,DO NOT SWALLOW, Disp: , Rfl:     clindamycin (Cleocin T) 1 % lotion, apply 1 Application to lesion on arm twice a day topically, Disp: , Rfl:     clobetasol (Temovate) 0.05 % gel, , Disp: , Rfl:     dapsone 100 mg tablet, Take 1 tablet (100 mg) by mouth once daily., Disp: , Rfl:     dapsone 25 mg tablet, Take 3 tablets (75 mg) by mouth once daily., Disp: , Rfl:     Mery-Hex 4 % external liquid, USE AS DIRECTED TOPICALLY, Disp: , Rfl:     fluticasone (Flonase) 50 mcg/actuation nasal spray, Administer 1 spray into each nostril 2 times a day., Disp: 16 g, Rfl: 2    lisinopril 20 mg tablet, Take 1 tablet (20 mg) by mouth once daily., Disp: 90 tablet, Rfl: 0    multivitamin with minerals (multivitamin with folic acid) tablet, Take by mouth., Disp: , Rfl:     omeprazole (PriLOSEC) 20 mg DR capsule, Take 1 capsule (20 mg) by mouth once daily. Do not crush or chew., Disp: 30 capsule, Rfl: 1    propranolol (Inderal) 20 mg tablet, Take 2 tablets (40 mg) by mouth 2 times a day., Disp: 360 tablet, Rfl: 3    tadalafil (Cialis) 5 mg tablet, Take 1 tablet (5 mg) by mouth once daily. (Patient not taking: Reported on 2/15/2024), Disp: 90 tablet, Rfl: 1    tadalafil 20 mg tablet, Take 1 tablet (20 mg) by mouth every other  day if needed for erectile dysfunction., Disp: , Rfl:     triamcinolone (Kenalog) 0.1 % ointment, Apply 1 Application twice a day as directed for 10 minutes under dental tray occlusion, Disp: , Rfl:      Objective   Well appearing patient in no apparent distress; mood and affect are within normal limits.    A focused examination  was performed including palate, gingiva, tongue, eyes, back  All findings within normal limits unless otherwise noted below.    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  - Reviewed labs from 1/2024 with stable Hgb of 11.6 basedline and CMP - stable  -Continue CBC/diff and CMP every other month, may space out to every 3 months at next visit if continues to be stable  -Continue clobetasol gel via dental tray per Dr. Brown twice a week. Continue to follow up with  as scheduled in April 2024  -Had eye exam with Dr. Baker 3/2023 which was unremarkable. He also has an annual check up with his regular optometrist in July 2024.   - Advised patient to not discontinue or self taper dapsone on his own or his MMP could flare.     Related Procedures  Follow Up In Dermatology - Established Patient    Seborrheic keratosis  Left Lower Back    Seborrheic keratosis left lower back  - Previously treated with LN2 by outside dermatologist  - Reassured patient of benign nature of this lesion and no further treatment is required       -Follow up in 6 month for MMP, recommended that if he has difficulty scheduling the appointment to send a [employer] message     Maribell Huertas MD   PGY-4 Dermatology Resident    I saw and evaluated the patient. I  personally obtained the key and critical portions of the history and physical exam or was physically present for key and critical portions performed by the resident/fellow. I reviewed the resident/fellow's documentation and discussed the patient with the resident/fellow. I agree with the resident/fellow's medical decision making as documented in the note.    Mika Triana MD PhD

## 2024-04-09 DIAGNOSIS — K21.9 GASTROESOPHAGEAL REFLUX DISEASE WITHOUT ESOPHAGITIS: ICD-10-CM

## 2024-04-09 RX ORDER — OMEPRAZOLE 20 MG/1
20 CAPSULE, DELAYED RELEASE ORAL DAILY
Qty: 30 CAPSULE | Refills: 1 | Status: SHIPPED | OUTPATIENT
Start: 2024-04-09 | End: 2024-06-08

## 2024-04-19 ENCOUNTER — LAB (OUTPATIENT)
Dept: LAB | Facility: LAB | Age: 69
End: 2024-04-19
Payer: MEDICARE

## 2024-04-19 DIAGNOSIS — Z79.899 OTHER LONG TERM (CURRENT) DRUG THERAPY: ICD-10-CM

## 2024-04-19 DIAGNOSIS — E78.2 MIXED HYPERLIPIDEMIA: ICD-10-CM

## 2024-04-19 DIAGNOSIS — Z12.5 PROSTATE CANCER SCREENING: ICD-10-CM

## 2024-04-19 DIAGNOSIS — L12.1 CICATRICIAL PEMPHIGOID (MULTI): ICD-10-CM

## 2024-04-19 LAB
ALBUMIN SERPL BCP-MCNC: 4.1 G/DL (ref 3.4–5)
ALP SERPL-CCNC: 68 U/L (ref 33–136)
ALT SERPL W P-5'-P-CCNC: 19 U/L (ref 10–52)
ANION GAP SERPL CALC-SCNC: 11 MMOL/L (ref 10–20)
AST SERPL W P-5'-P-CCNC: 15 U/L (ref 9–39)
BASOPHILS # BLD AUTO: 0.06 X10*3/UL (ref 0–0.1)
BASOPHILS NFR BLD AUTO: 1 %
BILIRUB SERPL-MCNC: 1 MG/DL (ref 0–1.2)
BUN SERPL-MCNC: 17 MG/DL (ref 6–23)
CALCIUM SERPL-MCNC: 9.3 MG/DL (ref 8.6–10.6)
CHLORIDE SERPL-SCNC: 104 MMOL/L (ref 98–107)
CHOLEST SERPL-MCNC: 129 MG/DL (ref 0–199)
CHOLESTEROL/HDL RATIO: 2.7
CO2 SERPL-SCNC: 27 MMOL/L (ref 21–32)
CREAT SERPL-MCNC: 0.89 MG/DL (ref 0.5–1.3)
EGFRCR SERPLBLD CKD-EPI 2021: >90 ML/MIN/1.73M*2
EOSINOPHIL # BLD AUTO: 0.21 X10*3/UL (ref 0–0.7)
EOSINOPHIL NFR BLD AUTO: 3.6 %
ERYTHROCYTE [DISTWIDTH] IN BLOOD BY AUTOMATED COUNT: 13.2 % (ref 11.5–14.5)
GLUCOSE SERPL-MCNC: 108 MG/DL (ref 74–99)
HCT VFR BLD AUTO: 35.5 % (ref 41–52)
HDLC SERPL-MCNC: 48.2 MG/DL
HGB BLD-MCNC: 11.6 G/DL (ref 13.5–17.5)
IMM GRANULOCYTES # BLD AUTO: 0.01 X10*3/UL (ref 0–0.7)
IMM GRANULOCYTES NFR BLD AUTO: 0.2 % (ref 0–0.9)
LDLC SERPL CALC-MCNC: 64 MG/DL
LYMPHOCYTES # BLD AUTO: 1.36 X10*3/UL (ref 1.2–4.8)
LYMPHOCYTES NFR BLD AUTO: 23.1 %
MCH RBC QN AUTO: 31 PG (ref 26–34)
MCHC RBC AUTO-ENTMCNC: 32.7 G/DL (ref 32–36)
MCV RBC AUTO: 95 FL (ref 80–100)
MONOCYTES # BLD AUTO: 0.53 X10*3/UL (ref 0.1–1)
MONOCYTES NFR BLD AUTO: 9 %
NEUTROPHILS # BLD AUTO: 3.71 X10*3/UL (ref 1.2–7.7)
NEUTROPHILS NFR BLD AUTO: 63.1 %
NON HDL CHOLESTEROL: 81 MG/DL (ref 0–149)
NRBC BLD-RTO: 0 /100 WBCS (ref 0–0)
PLATELET # BLD AUTO: 207 X10*3/UL (ref 150–450)
POTASSIUM SERPL-SCNC: 4.2 MMOL/L (ref 3.5–5.3)
PROT SERPL-MCNC: 6.7 G/DL (ref 6.4–8.2)
PSA SERPL-MCNC: 1.03 NG/ML
RBC # BLD AUTO: 3.74 X10*6/UL (ref 4.5–5.9)
SODIUM SERPL-SCNC: 138 MMOL/L (ref 136–145)
TRIGL SERPL-MCNC: 84 MG/DL (ref 0–149)
VLDL: 17 MG/DL (ref 0–40)
WBC # BLD AUTO: 5.9 X10*3/UL (ref 4.4–11.3)

## 2024-04-19 PROCEDURE — 36415 COLL VENOUS BLD VENIPUNCTURE: CPT

## 2024-04-19 PROCEDURE — 80053 COMPREHEN METABOLIC PANEL: CPT

## 2024-04-19 PROCEDURE — 80061 LIPID PANEL: CPT

## 2024-04-19 PROCEDURE — G0103 PSA SCREENING: HCPCS

## 2024-04-19 PROCEDURE — 85025 COMPLETE CBC W/AUTO DIFF WBC: CPT

## 2024-05-07 ENCOUNTER — OFFICE VISIT (OUTPATIENT)
Dept: ORTHOPEDIC SURGERY | Facility: HOSPITAL | Age: 69
End: 2024-05-07
Payer: MEDICARE

## 2024-05-07 ENCOUNTER — HOSPITAL ENCOUNTER (OUTPATIENT)
Dept: RADIOLOGY | Facility: HOSPITAL | Age: 69
Discharge: HOME | End: 2024-05-07
Payer: MEDICARE

## 2024-05-07 DIAGNOSIS — M70.21 OLECRANON BURSITIS, RIGHT ELBOW: Primary | ICD-10-CM

## 2024-05-07 DIAGNOSIS — M25.521 ELBOW PAIN, RIGHT: ICD-10-CM

## 2024-05-07 DIAGNOSIS — M25.521 RIGHT ELBOW PAIN: ICD-10-CM

## 2024-05-07 PROCEDURE — 1159F MED LIST DOCD IN RCRD: CPT | Performed by: ORTHOPAEDIC SURGERY

## 2024-05-07 PROCEDURE — 1160F RVW MEDS BY RX/DR IN RCRD: CPT | Performed by: ORTHOPAEDIC SURGERY

## 2024-05-07 PROCEDURE — 73080 X-RAY EXAM OF ELBOW: CPT | Mod: RT

## 2024-05-07 PROCEDURE — 73080 X-RAY EXAM OF ELBOW: CPT | Mod: RIGHT SIDE | Performed by: RADIOLOGY

## 2024-05-07 PROCEDURE — 1123F ACP DISCUSS/DSCN MKR DOCD: CPT | Performed by: ORTHOPAEDIC SURGERY

## 2024-05-07 PROCEDURE — 99213 OFFICE O/P EST LOW 20 MIN: CPT | Performed by: ORTHOPAEDIC SURGERY

## 2024-05-07 PROCEDURE — 99203 OFFICE O/P NEW LOW 30 MIN: CPT | Performed by: ORTHOPAEDIC SURGERY

## 2024-05-07 NOTE — PROGRESS NOTES
ORTHOPAEDIC HISTORY AND PHYSICAL    History Of Present Illness  Orthopaedic Problems/Injuries:  Vargehse Morales is a 68 y.o. male presenting with presents today with swelling over the posterior aspect of the right elbow.  Patient noted this about 3 weeks ago.  He is having stable.  Patient has no pain associated with this.  He is able to perform range of motion and activities.  He denies any trauma.  Occasionally he has pain when he is trying to lift weight on the right.  No erythema no drainage.  No numbness or tingling the hand.  Good range of motion of the elbow.      Review of Systems: 12 point ROS negative unless stated in HPI    Past Medical History  He has a past medical history of Abdominal distension (gaseous) (03/22/2017), Encounter for removal of sutures (07/18/2021), Essential (primary) hypertension (10/31/2022), Pain in left leg (07/27/2021), Personal history of diseases of the skin and subcutaneous tissue (07/18/2021), Personal history of other diseases of the nervous system and sense organs (05/29/2015), Personal history of other diseases of the nervous system and sense organs (05/29/2015), and Strain of other muscle(s) and tendon(s) at lower leg level, right leg, initial encounter (07/27/2016).    Surgical History  He has a past surgical history that includes Colonoscopy (12/05/2012).     Social History  He reports that he has quit smoking. His smoking use included cigarettes. He has never used smokeless tobacco. He reports current alcohol use. He reports that he does not use drugs.    Family History  Family History   Problem Relation Name Age of Onset    Coronary artery disease Father      Other (suicide completion) Brother      Diabetes Other      Hypertension Other      Cancer Other          Allergies  Other    Review of Systems     Physical Exam  Gen: The patient is alert and oriented ×3, is in no acute distress, and appear their stated age and weight.    Psychiatric: Mood and affect are  appropriate.    Eyes: Sclera are white, and pupils are round and symmetric.    ENT: Mucous membranes are moist.     Neck: Supple. Thyroid is midline.    Respiratory: Respirations are nonlabored, chest rise is symmetric.    Cardiac: Rate is regular by palpation of distal pulses.     Abdomen: Nondistended.    Integument: No obvious cutaneous lesions are noted. No signs of lymphangitis. No signs of systemic edema.    Evaluation of the right elbow reveals swelling and prominence over the olecranon bursa.  This is easily compressible and fluctuant.  There is no surrounding erythema.  No tenderness to palpation.  He has full range of motion of the elbow without any limitation.  No pain on range of motion.        Relevant Results  I personally reviewed right elbow radiograph and was normal.    Assessment/Plan   Patient presents today with a septic right olecranon bursitis.  I recommended conservative treatment.  I recommend ice and compression sleeve.  Patient already has a compression sleeve that is given to him from the urgent care.  I recommend against resting his elbow on a hard surface.  We discussed possible aspiration and corticosteroid injection.  However given he has bursitis is reasonably small in size and not painful he will continue activities as tolerated.  We discussed that there is a potential of this becoming septic.  We discussed signs and symptoms of severe redness and erythema.  He will come back and see me as needed if that happens.  He will come back and see me if there is no resolution over the next few months.

## 2024-05-13 ENCOUNTER — TELEPHONE (OUTPATIENT)
Dept: CARDIOLOGY | Facility: HOSPITAL | Age: 69
End: 2024-05-13
Payer: MEDICARE

## 2024-05-14 DIAGNOSIS — I71.21 ANEURYSM OF ASCENDING AORTA WITHOUT RUPTURE (CMS-HCC): Primary | ICD-10-CM

## 2024-05-15 ENCOUNTER — APPOINTMENT (OUTPATIENT)
Dept: CARDIOLOGY | Facility: CLINIC | Age: 69
End: 2024-05-15
Payer: MEDICARE

## 2024-05-31 DIAGNOSIS — E78.5 HYPERLIPIDEMIA, UNSPECIFIED HYPERLIPIDEMIA TYPE: Primary | ICD-10-CM

## 2024-05-31 DIAGNOSIS — R93.1 AGATSTON CORONARY ARTERY CALCIUM SCORE GREATER THAN 400: ICD-10-CM

## 2024-05-31 RX ORDER — ATORVASTATIN CALCIUM 40 MG/1
40 TABLET, FILM COATED ORAL NIGHTLY
Qty: 90 TABLET | Refills: 3 | Status: SHIPPED | OUTPATIENT
Start: 2024-05-31

## 2024-06-03 ENCOUNTER — OFFICE VISIT (OUTPATIENT)
Dept: NEUROLOGY | Facility: CLINIC | Age: 69
End: 2024-06-03
Payer: MEDICARE

## 2024-06-03 VITALS
DIASTOLIC BLOOD PRESSURE: 86 MMHG | SYSTOLIC BLOOD PRESSURE: 135 MMHG | HEART RATE: 65 BPM | HEIGHT: 70 IN | WEIGHT: 210 LBS | BODY MASS INDEX: 30.06 KG/M2

## 2024-06-03 DIAGNOSIS — G25.0 ESSENTIAL TREMOR: Primary | ICD-10-CM

## 2024-06-03 PROCEDURE — 3075F SYST BP GE 130 - 139MM HG: CPT | Performed by: PSYCHIATRY & NEUROLOGY

## 2024-06-03 PROCEDURE — 1123F ACP DISCUSS/DSCN MKR DOCD: CPT | Performed by: PSYCHIATRY & NEUROLOGY

## 2024-06-03 PROCEDURE — 1159F MED LIST DOCD IN RCRD: CPT | Performed by: PSYCHIATRY & NEUROLOGY

## 2024-06-03 PROCEDURE — 3079F DIAST BP 80-89 MM HG: CPT | Performed by: PSYCHIATRY & NEUROLOGY

## 2024-06-03 PROCEDURE — 1036F TOBACCO NON-USER: CPT | Performed by: PSYCHIATRY & NEUROLOGY

## 2024-06-03 PROCEDURE — 99213 OFFICE O/P EST LOW 20 MIN: CPT | Performed by: PSYCHIATRY & NEUROLOGY

## 2024-06-03 PROCEDURE — 1160F RVW MEDS BY RX/DR IN RCRD: CPT | Performed by: PSYCHIATRY & NEUROLOGY

## 2024-06-03 PROCEDURE — 1126F AMNT PAIN NOTED NONE PRSNT: CPT | Performed by: PSYCHIATRY & NEUROLOGY

## 2024-06-03 RX ORDER — NIRMATRELVIR AND RITONAVIR 300-100 MG
KIT ORAL
COMMUNITY
Start: 2023-02-02

## 2024-06-03 RX ORDER — PROPRANOLOL HYDROCHLORIDE 10 MG/1
10 TABLET ORAL 2 TIMES DAILY
Qty: 60 TABLET | Refills: 11 | Status: SHIPPED | OUTPATIENT
Start: 2024-06-03 | End: 2025-06-03

## 2024-06-03 RX ORDER — PROPRANOLOL HYDROCHLORIDE 20 MG/1
40 TABLET ORAL 2 TIMES DAILY
Qty: 360 TABLET | Refills: 3 | Status: SHIPPED | OUTPATIENT
Start: 2024-06-03 | End: 2025-06-03

## 2024-06-03 ASSESSMENT — ENCOUNTER SYMPTOMS
OCCASIONAL FEELINGS OF UNSTEADINESS: 0
LOSS OF SENSATION IN FEET: 0
DEPRESSION: 0

## 2024-06-03 ASSESSMENT — FAHN TOLOSA MARTIN TREMOR (FTM)
LLE TOTAL SCORE: 0
LE LEG FLEXED AT HIPS AND KNEES AT POSTURE: 0
DRAWING C TOTAL SCORE: 1
FACE IN REPOSE: 0
VOICE TOTAL SCORE: 1
RUE TOTAL SCORE: 1
FACE AT POSTURE WHEN STANDING OR SITTING: 0
DRAWING C LEFT SCORE: 1
DRAWING B TOTAL SCORE: 5
DRAWING A LEFT SCORE: 1
LE AT REST: 0
PART A SUBTOTAL SCORE: 6
DRAWING A RIGHT SCORE: 1
LE AT REST: 0
UE ARMS OUTSTRECHED WRISTS MILDLY EXTENDED FINGERS SPREAD APART: 1
FACE TOTAL: 0
DRAWING B RGHT SCORE: 2
HEAD AT POSTURE WHEN STANDING OR SITTING: 0
UE FINGER TO NOSE AND OTHER ACTIONS: 1
DRAWING B LEFT SCORE: 3
LE TOE TO FINGER IN A FLEXED POSTURE: 0
DRAWING A TOTAL SCORE: 2
LE LEG FLEXED AT HIPS AND KNEES AT POSTURE: 0
LE TOE TO FINGER IN A FLEXED POSTURE: 0
TOUNGE WHEN PROTUDED: 1
TOUNGE TOTAL SCORE: 1
UE ARM AT REST: 0
HANDWRITING WITH DOMINANT HAND: 0
TRUNK  WHEN SITTING OR STANDING: 0
UE ARM AT REST: 0
TRUNK IN REPOSE: 0
UE FINGER TO NOSE AND OTHER ACTIONS: 2
HEAD IN REPOSE: 0
DRAWING C RIGHT SCORE: 0
VOICE IN ACTION: 1
HEAD TOTAL SCORE: 0
RUE TOTAL SCORE: 3
TOUNGE AT REST: 0
UE ARMS OUTSTRECHED WRISTS MILDLY EXTENDED FINGERS SPREAD APART: 0
RLE TOTAL SCORE: 0
TRUNK TOTAL SCORE: 0

## 2024-06-03 ASSESSMENT — PATIENT HEALTH QUESTIONNAIRE - PHQ9
SUM OF ALL RESPONSES TO PHQ9 QUESTIONS 1 & 2: 0
2. FEELING DOWN, DEPRESSED OR HOPELESS: NOT AT ALL
1. LITTLE INTEREST OR PLEASURE IN DOING THINGS: NOT AT ALL

## 2024-06-03 ASSESSMENT — LIFESTYLE VARIABLES
SKIP TO QUESTIONS 9-10: 1
HOW OFTEN DO YOU HAVE A DRINK CONTAINING ALCOHOL: NEVER
AUDIT-C TOTAL SCORE: 0
HOW MANY STANDARD DRINKS CONTAINING ALCOHOL DO YOU HAVE ON A TYPICAL DAY: PATIENT DOES NOT DRINK
HOW OFTEN DO YOU HAVE SIX OR MORE DRINKS ON ONE OCCASION: NEVER

## 2024-06-03 ASSESSMENT — ANXIETY QUESTIONNAIRES
2. NOT BEING ABLE TO STOP OR CONTROL WORRYING: NOT AT ALL
7. FEELING AFRAID AS IF SOMETHING AWFUL MIGHT HAPPEN: NOT AT ALL
IF YOU CHECKED OFF ANY PROBLEMS ON THIS QUESTIONNAIRE, HOW DIFFICULT HAVE THESE PROBLEMS MADE IT FOR YOU TO DO YOUR WORK, TAKE CARE OF THINGS AT HOME, OR GET ALONG WITH OTHER PEOPLE: NOT DIFFICULT AT ALL
6. BECOMING EASILY ANNOYED OR IRRITABLE: NOT AT ALL
GAD7 TOTAL SCORE: 0
5. BEING SO RESTLESS THAT IT IS HARD TO SIT STILL: NOT AT ALL
3. WORRYING TOO MUCH ABOUT DIFFERENT THINGS: NOT AT ALL
4. TROUBLE RELAXING: NOT AT ALL
1. FEELING NERVOUS, ANXIOUS, OR ON EDGE: NOT AT ALL

## 2024-06-03 ASSESSMENT — PAIN SCALES - GENERAL: PAINLEVEL: 0-NO PAIN
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
## 2024-06-03 NOTE — PATIENT INSTRUCTIONS
It was nice to see you today. Essential tremor.     1, continue propranolol 40 mg bid, I have given you a small script of 10 mg twice a day, for days that you want to take 30 mg twice a day.   2. RTC in one year
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
## 2024-06-08 DIAGNOSIS — N40.1 BPH WITH OBSTRUCTION/LOWER URINARY TRACT SYMPTOMS: Primary | ICD-10-CM

## 2024-06-08 DIAGNOSIS — N13.8 BPH WITH OBSTRUCTION/LOWER URINARY TRACT SYMPTOMS: Primary | ICD-10-CM

## 2024-06-08 RX ORDER — TADALAFIL 5 MG/1
5 TABLET ORAL DAILY
Qty: 90 TABLET | Refills: 1 | Status: SHIPPED | OUTPATIENT
Start: 2024-06-08 | End: 2024-12-05

## 2024-06-12 ENCOUNTER — APPOINTMENT (OUTPATIENT)
Dept: CARDIOLOGY | Facility: CLINIC | Age: 69
End: 2024-06-12
Payer: MEDICARE

## 2024-06-14 ENCOUNTER — APPOINTMENT (OUTPATIENT)
Dept: CARDIOLOGY | Facility: CLINIC | Age: 69
End: 2024-06-14
Payer: MEDICARE

## 2024-06-24 ENCOUNTER — APPOINTMENT (OUTPATIENT)
Dept: CARDIOLOGY | Facility: CLINIC | Age: 69
End: 2024-06-24
Payer: MEDICARE

## 2024-06-26 ENCOUNTER — LAB (OUTPATIENT)
Dept: LAB | Facility: LAB | Age: 69
End: 2024-06-26
Payer: MEDICARE

## 2024-06-26 ENCOUNTER — HOSPITAL ENCOUNTER (OUTPATIENT)
Dept: CARDIOLOGY | Facility: CLINIC | Age: 69
Discharge: HOME | End: 2024-06-26
Payer: MEDICARE

## 2024-06-26 DIAGNOSIS — Z79.899 OTHER LONG TERM (CURRENT) DRUG THERAPY: ICD-10-CM

## 2024-06-26 DIAGNOSIS — L12.1 CICATRICIAL PEMPHIGOID (MULTI): ICD-10-CM

## 2024-06-26 DIAGNOSIS — I71.21 ANEURYSM OF ASCENDING AORTA WITHOUT RUPTURE (CMS-HCC): ICD-10-CM

## 2024-06-26 LAB
ALBUMIN SERPL BCP-MCNC: 4.1 G/DL (ref 3.4–5)
ALP SERPL-CCNC: 77 U/L (ref 33–136)
ALT SERPL W P-5'-P-CCNC: 17 U/L (ref 10–52)
ANION GAP SERPL CALC-SCNC: 12 MMOL/L (ref 10–20)
AORTIC VALVE PEAK VELOCITY: 1.56 M/S
AST SERPL W P-5'-P-CCNC: 17 U/L (ref 9–39)
AV PEAK GRADIENT: 9.8 MMHG
AVA (PEAK VEL): 4.71 CM2
BASOPHILS # BLD AUTO: 0.05 X10*3/UL (ref 0–0.1)
BASOPHILS NFR BLD AUTO: 0.9 %
BILIRUB SERPL-MCNC: 0.9 MG/DL (ref 0–1.2)
BUN SERPL-MCNC: 20 MG/DL (ref 6–23)
CALCIUM SERPL-MCNC: 9.3 MG/DL (ref 8.6–10.6)
CHLORIDE SERPL-SCNC: 103 MMOL/L (ref 98–107)
CO2 SERPL-SCNC: 26 MMOL/L (ref 21–32)
CREAT SERPL-MCNC: 0.9 MG/DL (ref 0.5–1.3)
EGFRCR SERPLBLD CKD-EPI 2021: >90 ML/MIN/1.73M*2
EJECTION FRACTION APICAL 4 CHAMBER: 63.3
EJECTION FRACTION: 65 %
EOSINOPHIL # BLD AUTO: 0.12 X10*3/UL (ref 0–0.7)
EOSINOPHIL NFR BLD AUTO: 2 %
ERYTHROCYTE [DISTWIDTH] IN BLOOD BY AUTOMATED COUNT: 13.3 % (ref 11.5–14.5)
GLUCOSE SERPL-MCNC: 104 MG/DL (ref 74–99)
HCT VFR BLD AUTO: 36 % (ref 41–52)
HGB BLD-MCNC: 11.5 G/DL (ref 13.5–17.5)
IMM GRANULOCYTES # BLD AUTO: 0.02 X10*3/UL (ref 0–0.7)
IMM GRANULOCYTES NFR BLD AUTO: 0.3 % (ref 0–0.9)
LEFT ATRIUM VOLUME AREA LENGTH INDEX BSA: 53.8 ML/M2
LEFT VENTRICULAR OUTFLOW TRACT DIAMETER: 2.57 CM
LYMPHOCYTES # BLD AUTO: 1.2 X10*3/UL (ref 1.2–4.8)
LYMPHOCYTES NFR BLD AUTO: 20.4 %
MCH RBC QN AUTO: 31 PG (ref 26–34)
MCHC RBC AUTO-ENTMCNC: 31.9 G/DL (ref 32–36)
MCV RBC AUTO: 97 FL (ref 80–100)
MITRAL VALVE E/A RATIO: 1.95
MITRAL VALVE E/E' RATIO: 10.3
MONOCYTES # BLD AUTO: 0.54 X10*3/UL (ref 0.1–1)
MONOCYTES NFR BLD AUTO: 9.2 %
NEUTROPHILS # BLD AUTO: 3.94 X10*3/UL (ref 1.2–7.7)
NEUTROPHILS NFR BLD AUTO: 67.2 %
NRBC BLD-RTO: 0 /100 WBCS (ref 0–0)
PLATELET # BLD AUTO: 200 X10*3/UL (ref 150–450)
POTASSIUM SERPL-SCNC: 4.6 MMOL/L (ref 3.5–5.3)
PROT SERPL-MCNC: 6.8 G/DL (ref 6.4–8.2)
RBC # BLD AUTO: 3.71 X10*6/UL (ref 4.5–5.9)
RIGHT VENTRICLE FREE WALL PEAK S': 10 CM/S
RIGHT VENTRICLE PEAK SYSTOLIC PRESSURE: 23.5 MMHG
SODIUM SERPL-SCNC: 136 MMOL/L (ref 136–145)
TRICUSPID ANNULAR PLANE SYSTOLIC EXCURSION: 2.1 CM
WBC # BLD AUTO: 5.9 X10*3/UL (ref 4.4–11.3)

## 2024-06-26 PROCEDURE — 93306 TTE W/DOPPLER COMPLETE: CPT

## 2024-06-26 PROCEDURE — 93306 TTE W/DOPPLER COMPLETE: CPT | Performed by: INTERNAL MEDICINE

## 2024-06-26 PROCEDURE — 85025 COMPLETE CBC W/AUTO DIFF WBC: CPT

## 2024-06-26 PROCEDURE — 80053 COMPREHEN METABOLIC PANEL: CPT

## 2024-06-26 PROCEDURE — 36415 COLL VENOUS BLD VENIPUNCTURE: CPT

## 2024-07-03 ENCOUNTER — OFFICE VISIT (OUTPATIENT)
Dept: CARDIOLOGY | Facility: CLINIC | Age: 69
End: 2024-07-03
Payer: MEDICARE

## 2024-07-03 VITALS
HEART RATE: 54 BPM | HEIGHT: 72 IN | BODY MASS INDEX: 28.44 KG/M2 | WEIGHT: 210 LBS | DIASTOLIC BLOOD PRESSURE: 90 MMHG | SYSTOLIC BLOOD PRESSURE: 155 MMHG | OXYGEN SATURATION: 95 %

## 2024-07-03 DIAGNOSIS — I71.21 ANEURYSM OF ASCENDING AORTA WITHOUT RUPTURE (CMS-HCC): ICD-10-CM

## 2024-07-03 DIAGNOSIS — E78.2 MIXED HYPERLIPIDEMIA: ICD-10-CM

## 2024-07-03 DIAGNOSIS — R93.1 AGATSTON CORONARY ARTERY CALCIUM SCORE GREATER THAN 400: ICD-10-CM

## 2024-07-03 DIAGNOSIS — I10 HYPERTENSION, UNSPECIFIED TYPE: Primary | ICD-10-CM

## 2024-07-03 DIAGNOSIS — I25.10 ARTERIOSCLEROSIS OF CORONARY ARTERY: ICD-10-CM

## 2024-07-03 PROCEDURE — 1126F AMNT PAIN NOTED NONE PRSNT: CPT | Performed by: INTERNAL MEDICINE

## 2024-07-03 PROCEDURE — 3077F SYST BP >= 140 MM HG: CPT | Performed by: INTERNAL MEDICINE

## 2024-07-03 PROCEDURE — 3080F DIAST BP >= 90 MM HG: CPT | Performed by: INTERNAL MEDICINE

## 2024-07-03 PROCEDURE — 99214 OFFICE O/P EST MOD 30 MIN: CPT | Performed by: INTERNAL MEDICINE

## 2024-07-03 PROCEDURE — 1123F ACP DISCUSS/DSCN MKR DOCD: CPT | Performed by: INTERNAL MEDICINE

## 2024-07-03 PROCEDURE — 1036F TOBACCO NON-USER: CPT | Performed by: INTERNAL MEDICINE

## 2024-07-03 PROCEDURE — 1159F MED LIST DOCD IN RCRD: CPT | Performed by: INTERNAL MEDICINE

## 2024-07-03 PROCEDURE — 93005 ELECTROCARDIOGRAM TRACING: CPT | Performed by: INTERNAL MEDICINE

## 2024-07-03 PROCEDURE — 1160F RVW MEDS BY RX/DR IN RCRD: CPT | Performed by: INTERNAL MEDICINE

## 2024-07-03 ASSESSMENT — ENCOUNTER SYMPTOMS
LOSS OF SENSATION IN FEET: 0
DEPRESSION: 0
OCCASIONAL FEELINGS OF UNSTEADINESS: 0

## 2024-07-03 ASSESSMENT — PAIN SCALES - GENERAL: PAINLEVEL: 0-NO PAIN

## 2024-07-03 ASSESSMENT — COLUMBIA-SUICIDE SEVERITY RATING SCALE - C-SSRS
2. HAVE YOU ACTUALLY HAD ANY THOUGHTS OF KILLING YOURSELF?: NO
6. HAVE YOU EVER DONE ANYTHING, STARTED TO DO ANYTHING, OR PREPARED TO DO ANYTHING TO END YOUR LIFE?: NO
1. IN THE PAST MONTH, HAVE YOU WISHED YOU WERE DEAD OR WISHED YOU COULD GO TO SLEEP AND NOT WAKE UP?: NO

## 2024-07-03 ASSESSMENT — PATIENT HEALTH QUESTIONNAIRE - PHQ9
2. FEELING DOWN, DEPRESSED OR HOPELESS: NOT AT ALL
SUM OF ALL RESPONSES TO PHQ9 QUESTIONS 1 AND 2: 0
1. LITTLE INTEREST OR PLEASURE IN DOING THINGS: NOT AT ALL

## 2024-07-03 NOTE — PATIENT INSTRUCTIONS
You were seen in the [facility] for your coronary arteriosclerosis (hardening of the heart arteries) and high calcium score.     For your coronary arteries, your 10 year risks score for a heart attack was 15-20% before we started medications. Your cholesterol numbers now look great. Continue aspirin 81 mg a day, atorvastatin 40 mg a day, and lisinopril 20 mg a day. Exercise  minutes a week can help lower your risk for a heart attack substantially. Eating a Mediterranean diet has been shown to prevent heart attacks.      Keep a log book of your blood pressures and check at least 2-3 times a week.     Your April 2024 cholesterol blood work is well controlled with a LDL (bad) cholesterol of 64 below long term goal of LDL < 70 while taking atorvastatin 40 mg a day.    Eat a heart healthy diet. Limit portions of red meat. Eat fresh fruits and vegetables instead of processed carbohydrates. Olive oil (1 tablespoon a day), avocados, tree nuts as a source of  omega-3 fat. Beans and legumes are good sources of plant based protein and fiber. The Mediterranean diet has been shown in clinical trials to reduce risk of heart disease by following these principles.     Your 2018 CT calcium score showed a mild enlargement of your aorta as it exits your heart at 4.1 cm. Your June 2024 echocardiogram showed stable ascending aorta size of 3.9 cm compared to prior September 2022 echocardiogram that measured your ascending aorta size at 3.8 cm.     I will have you repeat an echocardiogram (ultrasound of the heart) in 2 years for our next measurement of your aorta in spring/summer 2026.      Follow up with Dr. Moura in 12 months.

## 2024-07-03 NOTE — PROGRESS NOTES
Subjective   Varghese Morales is a 68 y.o. male who presents to the [facility] for follow upf coronary arteriosclerosis with elevated CT calcium score. Last seen in May 2023.     Since our last visit, Mr. Morales has stable cardiac symptomatology. No further vasovagal episodes. No active cardiac symptoms of chest pain, dyspnea on exertion, palpitations, PND, orthopnea, DELIA, or claudication.     Exercises 3x/week (swimming currently) and was biking riding and swimming outdoors at the pool this summer.      Past Medical History:  1. Coronary arteriosclerosis: 2018 CAC: 731 (LMCA 8, , LCx 1). TA 10 year risk score 18-20%  2. Dyslipidemia: LDL < 70 on 2019 lipid panel taking atorvastatin 40 mg a day.  3. Hypertension  4. Syncope episode , work up at [facility] negative (stress echo: 10 METs, 94% MPHR, LV EF 60-65% baseline); recurrent episode 2018 (see my office note from 2018 for full details of episode)     Social History:  Former smoker, quit remotely     Family History:  Father had CAD,  of MI at 58 yo.     Review of Systems    A 14 point review of systems was asked. All questions were negative except for pertinent positives listed in the HPI.     Current Outpatient Medications on File Prior to Visit   Medication Sig Dispense Refill    atorvastatin (Lipitor) 40 mg tablet Take 1 tablet (40 mg) by mouth once daily at bedtime. 90 tablet 3    betamethasone, augmented, (Diprolene AF) 0.05 % cream Take 1apply to affected areas twice a day      clobetasol (Temovate) 0.05 % gel       dapsone 100 mg tablet Take 1 tablet (100 mg) by mouth once daily.      dapsone 25 mg tablet Take 3 tablets (75 mg) by mouth once daily.      fluticasone (Flonase) 50 mcg/actuation nasal spray Administer 1 spray into each nostril 2 times a day. 16 g 2    lisinopril 20 mg tablet Take 1 tablet (20 mg) by mouth once daily. 90 tablet 0    omeprazole (PriLOSEC) 20 mg DR capsule Take 1 capsule (20  "mg) by mouth once daily. Do not crush or chew. 30 capsule 1    propranolol (Inderal) 10 mg tablet Take 1 tablet (10 mg) by mouth 2 times a day. Take as needed when want to take 30 mg twice a day of propranolol instead of 40 mg twice a day. 60 tablet 11    propranolol (Inderal) 20 mg tablet Take 2 tablets (40 mg) by mouth 2 times a day. 360 tablet 3    tadalafil (Cialis) 5 mg tablet Take 1 tablet (5 mg) by mouth once daily. 90 tablet 1    tadalafil 20 mg tablet Take 1 tablet (20 mg) by mouth every other day if needed for erectile dysfunction.      triamcinolone (Kenalog) 0.1 % ointment Apply 1 Application twice a day as directed for 10 minutes under dental tray occlusion      [DISCONTINUED] chlorhexidine (Peridex) 0.12 % solution RINSE MOUTH WITH 15 ML'S FOR 30 SECONDS IN THE MORNING AND IN THE EVENING AFTER TOOTHBRUSHING.EXPECTORATE AFTER RINSING,DO NOT SWALLOW      [DISCONTINUED] clindamycin (Cleocin T) 1 % lotion apply 1 Application to lesion on arm twice a day topically      [DISCONTINUED] Mery-Hex 4 % external liquid USE AS DIRECTED TOPICALLY      [DISCONTINUED] multivitamin with minerals (multivitamin with folic acid) tablet Take by mouth.      [DISCONTINUED] nirmatrelvir-ritonavir (Paxlovid) 300 mg (150 mg x 2)-100 mg tablet therapy pack Take by mouth.      [DISCONTINUED] nirmatrelvir-ritonavir (Paxlovid) 300 mg (150 mg x 2)-100 mg tablet therapy pack Paxlovid 300 mg (150 mg x 2)-100 mg tablet therapy pack AS DIRECTED TWICE A DAY 0 02/02/2023 Active       No current facility-administered medications on file prior to visit.      Objective   Physical Exam  BP Readings from Last 3 Encounters:   06/03/24 135/86   02/15/24 138/80   10/20/23 126/75      Wt Readings from Last 3 Encounters:   06/03/24 95.3 kg (210 lb)   02/15/24 95.7 kg (211 lb)   10/20/23 101 kg (222 lb 3.2 oz)      BMI: Estimated body mass index is 30.13 kg/m² as calculated from the following:    Height as of 6/3/24: 1.778 m (5' 10\").    Weight as " "of 6/3/24: 95.3 kg (210 lb).  BSA: Estimated body surface area is 2.17 meters squared as calculated from the following:    Height as of 6/3/24: 1.778 m (5' 10\").    Weight as of 6/3/24: 95.3 kg (210 lb).    General: no acute distress  HEENT: EOMI, no scleral icterus.  Lungs: Clear to auscultation bilaterally without wheezing, rales, or rhonchi.  Cardiovascular: Regular rhythm and rate. Normal S1 and S2. No murmurs, rubs, or gallops are appreciated. JVP normal.  Abdomen: Soft, nontender, nondistended. Bowel sounds present.  Extremities: Warm and well perfused with equal 2+ pulses bilaterally.  No edema present.  Neurologic: Alert and oriented x3.    I have personally reviewed the following images and laboratory findings:  Last echocardiogram:  Most recent echo, 2024: LV EF 60-65%, no LVH (LVMI not measured), normal diastology (E/e' not measured), normal RV/RA, trivial AI, mild MAC, trace MR, mild TR, RVSP 24 mm Hg (RAP 3 mm Hg), Ao root 3.4 cm, ascending aorta 3.9 cm.    2022 echo: LV EF 60-65%, no LVH (LVMI 88 gm/m2), normal diastology (E/e' ), mild LAE (44 ml/m2), normal RV/RA, trivial AI, mild MAC, mild MR, trace TR, unable to estimate RVSP, Asc Ao 3.8 cm / Ao root 3.8 cm, IVC normal size (RAP 3 mm Hg).    Last cath / stress test / CACS:  2018 CAC: 731 (LMCA 8, , LCx 1). TA 10 year risk score 18-20%    Most recent EC/3/2024 EC/10/2023 ECG: Normal ECG, 54 bpm. Personally reviewed in office.     Lab Results   Component Value Date    CHOL 129 2024    CHOL 116 2022    CHOL 142 2021     Lab Results   Component Value Date    HDL 48.2 2024    HDL 47.0 2022    HDL 54.0 2021     Lab Results   Component Value Date    LDLCALC 64 2024     Lab Results   Component Value Date    TRIG 84 2024    TRIG 74 2022    TRIG 94 2021     No components found for: \"CHOLHDL\"      Assessment/Plan   1. Coronary arteriosclerosis:  Continue high " intensity statin (atorvastatin 40 mg), aspirin 81 mg, and lisinopril 20 mg daily. LDL at goal of < 70 on 4/19/2024 lipid panel. Exercise prescription and Mediterranean diet discussed for lifestyle modification for CV risk reduction. Exercise levels at goal.      Instructed patient to check BP at home 2-3x/week and keep log book. Blood pressure at goal today taking lisinopril 20 mg a day.      2. Thoracic ascending aorta:  2018 noncontrast CT for calcium score estimated ascending aorta at 4.1 cm. September 2022 echocardiogram showed small diameter at 3.8 cm. 6/26/2024 repeat echocardiogram showed stable ascending aorta diameter of 3.9 cm. Will plan for next imaging in 2 years (spring 2026 ) for next repeat assessment of aortic valve function and ascending aorta diameter for progression.     Follow up with Dr. Moura in 12 months.            SIGNATURE: Milo Moura MD PATIENT NAME: Varghese Morales   DATE/TIME: July 3, 2024 11:59 AM MRN: [id number]

## 2024-07-05 LAB
ATRIAL RATE: 54 BPM
P AXIS: 43 DEGREES
P OFFSET: 199 MS
P ONSET: 129 MS
PR INTERVAL: 170 MS
Q ONSET: 214 MS
QRS COUNT: 8 BEATS
QRS DURATION: 98 MS
QT INTERVAL: 440 MS
QTC CALCULATION(BAZETT): 417 MS
QTC FREDERICIA: 424 MS
R AXIS: 68 DEGREES
T AXIS: 66 DEGREES
T OFFSET: 434 MS
VENTRICULAR RATE: 54 BPM

## 2024-07-09 DIAGNOSIS — I10 PRIMARY HYPERTENSION: ICD-10-CM

## 2024-07-09 RX ORDER — LISINOPRIL 20 MG/1
20 TABLET ORAL DAILY
Qty: 90 TABLET | Refills: 0 | Status: SHIPPED | OUTPATIENT
Start: 2024-07-09

## 2024-09-05 ENCOUNTER — LAB (OUTPATIENT)
Dept: LAB | Facility: LAB | Age: 69
End: 2024-09-05
Payer: MEDICARE

## 2024-09-05 DIAGNOSIS — Z79.899 OTHER LONG TERM (CURRENT) DRUG THERAPY: ICD-10-CM

## 2024-09-05 DIAGNOSIS — N40.1 BPH WITH OBSTRUCTION/LOWER URINARY TRACT SYMPTOMS: ICD-10-CM

## 2024-09-05 DIAGNOSIS — N13.8 BPH WITH OBSTRUCTION/LOWER URINARY TRACT SYMPTOMS: ICD-10-CM

## 2024-09-05 DIAGNOSIS — L12.1 CICATRICIAL PEMPHIGOID (MULTI): ICD-10-CM

## 2024-09-05 LAB
ALBUMIN SERPL BCP-MCNC: 4.2 G/DL (ref 3.4–5)
ALP SERPL-CCNC: 71 U/L (ref 33–136)
ALT SERPL W P-5'-P-CCNC: 18 U/L (ref 10–52)
ANION GAP SERPL CALC-SCNC: 10 MMOL/L (ref 10–20)
AST SERPL W P-5'-P-CCNC: 16 U/L (ref 9–39)
BASOPHILS # BLD AUTO: 0.04 X10*3/UL (ref 0–0.1)
BASOPHILS NFR BLD AUTO: 0.6 %
BILIRUB SERPL-MCNC: 0.8 MG/DL (ref 0–1.2)
BUN SERPL-MCNC: 23 MG/DL (ref 6–23)
CALCIUM SERPL-MCNC: 9.2 MG/DL (ref 8.6–10.6)
CHLORIDE SERPL-SCNC: 103 MMOL/L (ref 98–107)
CO2 SERPL-SCNC: 28 MMOL/L (ref 21–32)
CREAT SERPL-MCNC: 0.93 MG/DL (ref 0.5–1.3)
EGFRCR SERPLBLD CKD-EPI 2021: 90 ML/MIN/1.73M*2
EOSINOPHIL # BLD AUTO: 0.2 X10*3/UL (ref 0–0.7)
EOSINOPHIL NFR BLD AUTO: 2.9 %
ERYTHROCYTE [DISTWIDTH] IN BLOOD BY AUTOMATED COUNT: 13.2 % (ref 11.5–14.5)
GLUCOSE SERPL-MCNC: 84 MG/DL (ref 74–99)
HCT VFR BLD AUTO: 36.8 % (ref 41–52)
HGB BLD-MCNC: 11.5 G/DL (ref 13.5–17.5)
IMM GRANULOCYTES # BLD AUTO: 0.03 X10*3/UL (ref 0–0.7)
IMM GRANULOCYTES NFR BLD AUTO: 0.4 % (ref 0–0.9)
LYMPHOCYTES # BLD AUTO: 1.4 X10*3/UL (ref 1.2–4.8)
LYMPHOCYTES NFR BLD AUTO: 20.4 %
MCH RBC QN AUTO: 30.2 PG (ref 26–34)
MCHC RBC AUTO-ENTMCNC: 31.3 G/DL (ref 32–36)
MCV RBC AUTO: 97 FL (ref 80–100)
MONOCYTES # BLD AUTO: 0.77 X10*3/UL (ref 0.1–1)
MONOCYTES NFR BLD AUTO: 11.2 %
NEUTROPHILS # BLD AUTO: 4.42 X10*3/UL (ref 1.2–7.7)
NEUTROPHILS NFR BLD AUTO: 64.5 %
NRBC BLD-RTO: 0 /100 WBCS (ref 0–0)
PLATELET # BLD AUTO: 204 X10*3/UL (ref 150–450)
POTASSIUM SERPL-SCNC: 4.8 MMOL/L (ref 3.5–5.3)
PROT SERPL-MCNC: 6.9 G/DL (ref 6.4–8.2)
PSA SERPL-MCNC: 1.06 NG/ML
RBC # BLD AUTO: 3.81 X10*6/UL (ref 4.5–5.9)
SODIUM SERPL-SCNC: 136 MMOL/L (ref 136–145)
WBC # BLD AUTO: 6.9 X10*3/UL (ref 4.4–11.3)

## 2024-09-05 PROCEDURE — 36415 COLL VENOUS BLD VENIPUNCTURE: CPT

## 2024-09-05 PROCEDURE — 84153 ASSAY OF PSA TOTAL: CPT

## 2024-09-05 PROCEDURE — 80053 COMPREHEN METABOLIC PANEL: CPT

## 2024-09-05 PROCEDURE — 85025 COMPLETE CBC W/AUTO DIFF WBC: CPT

## 2024-09-13 ENCOUNTER — APPOINTMENT (OUTPATIENT)
Dept: DERMATOLOGY | Facility: CLINIC | Age: 69
End: 2024-09-13
Payer: MEDICARE

## 2024-09-13 DIAGNOSIS — L12.1 MUCOUS MEMBRANE PEMPHIGOID (MULTI): ICD-10-CM

## 2024-09-13 PROCEDURE — 1159F MED LIST DOCD IN RCRD: CPT | Performed by: DERMATOLOGY

## 2024-09-13 PROCEDURE — 1123F ACP DISCUSS/DSCN MKR DOCD: CPT | Performed by: DERMATOLOGY

## 2024-09-13 PROCEDURE — 99213 OFFICE O/P EST LOW 20 MIN: CPT | Performed by: DERMATOLOGY

## 2024-09-13 RX ORDER — DAPSONE 25 MG/1
75 TABLET ORAL DAILY
Qty: 90 TABLET | Refills: 11 | Status: SHIPPED | OUTPATIENT
Start: 2024-09-13

## 2024-09-13 NOTE — PROGRESS NOTES
Subjective     Varghese Morales is a 68 y.o. male who presents for the following: MMP (Dapsone 100 mg daily and Clobetasol ointment trays once a week - doing well ).     Review of Systems:  No other skin or systemic complaints other than what is documented elsewhere in the note.    The following portions of the chart were reviewed this encounter and updated as appropriate:          Skin Cancer History  No skin cancer on file.      Specialty Problems          Dermatology Problems    Disorder of skin and subcutaneous tissue    Mucous membrane pemphigoid (Multi)    Scar    Traumatic open wound of right lower leg        Objective   Well appearing patient in no apparent distress; mood and affect are within normal limits.    A focused skin examination was performed. All findings within normal limits unless otherwise noted below.    Assessment/Plan   1. Mucous membrane pemphigoid (Multi)  Faint erythema above right frontal tooth., Normal appearing mucosa and gingiva without erosions    Mucous membrane pemphigoid affecting the oral mucosa  - Well controlled on dapsone, denies ocular statements. States his dentist noted gum sloughing when performing tooth bonding  -Currently stable with dapsone 100 mg daily and clobetasol ointment weekly via dental tray   - Reviewed labs from 9/2024 with stable Hgb of 11.6 baseline and CMP - stable  -Continue CBC/diff and CMP every 2 months  -Continue clobetasol gel via dental tray per Dr. Brown twice a week.  -Had eye exam with Dr. Baker 3/2023 which was unremarkable.   - DECREASE dapsone to 75 mg daily. Rx provided.    dapsone 25 mg tablet  Take 3 tablets (75 mg) by mouth once daily.    Related Procedures  Follow Up In Dermatology - Established Patient  Follow Up In Dermatology - Established Patient      RTC 4 months    Patient seen and discussed with Dr. Triana,   Jagruti Kline MD MPH  PGY-2, Department of Dermatology    I saw and evaluated the patient. I personally obtained the key and  critical portions of the history and physical exam or was physically present for key and critical portions performed by the resident/fellow. I reviewed the resident/fellow's documentation and discussed the patient with the resident/fellow. I agree with the resident/fellow's medical decision making as documented in the note.    Mika Triana MD PhD

## 2024-09-15 DIAGNOSIS — K21.9 GASTROESOPHAGEAL REFLUX DISEASE WITHOUT ESOPHAGITIS: ICD-10-CM

## 2024-09-16 RX ORDER — OMEPRAZOLE 20 MG/1
20 CAPSULE, DELAYED RELEASE ORAL DAILY
Qty: 30 CAPSULE | Refills: 1 | Status: SHIPPED | OUTPATIENT
Start: 2024-09-16 | End: 2024-11-15

## 2024-09-24 DIAGNOSIS — L12.1 MUCOUS MEMBRANE PEMPHIGOID (MULTI): ICD-10-CM

## 2024-09-24 RX ORDER — CLOBETASOL PROPIONATE 0.05 MG/G
1 GEL TOPICAL DAILY
Qty: 60 G | Refills: 5 | Status: SHIPPED | OUTPATIENT
Start: 2024-09-24

## 2024-10-06 DIAGNOSIS — I10 PRIMARY HYPERTENSION: ICD-10-CM

## 2024-10-07 RX ORDER — LISINOPRIL 20 MG/1
20 TABLET ORAL DAILY
Qty: 90 TABLET | Refills: 2 | Status: SHIPPED | OUTPATIENT
Start: 2024-10-07

## 2024-10-22 DIAGNOSIS — L12.1 MUCOUS MEMBRANE PEMPHIGOID (MULTI): ICD-10-CM

## 2024-10-22 RX ORDER — DAPSONE 25 MG/1
75 TABLET ORAL DAILY
Qty: 270 TABLET | Refills: 3 | Status: SHIPPED | OUTPATIENT
Start: 2024-10-22 | End: 2025-10-17

## 2024-10-24 ENCOUNTER — APPOINTMENT (OUTPATIENT)
Dept: UROLOGY | Facility: CLINIC | Age: 69
End: 2024-10-24
Payer: MEDICARE

## 2024-10-24 VITALS — SYSTOLIC BLOOD PRESSURE: 120 MMHG | DIASTOLIC BLOOD PRESSURE: 71 MMHG | HEART RATE: 51 BPM

## 2024-10-24 DIAGNOSIS — N13.8 BPH WITH OBSTRUCTION/LOWER URINARY TRACT SYMPTOMS: Primary | ICD-10-CM

## 2024-10-24 DIAGNOSIS — N52.9 ERECTILE DISORDER: ICD-10-CM

## 2024-10-24 DIAGNOSIS — N40.1 BPH WITH OBSTRUCTION/LOWER URINARY TRACT SYMPTOMS: Primary | ICD-10-CM

## 2024-10-24 PROCEDURE — 1123F ACP DISCUSS/DSCN MKR DOCD: CPT | Performed by: NURSE PRACTITIONER

## 2024-10-24 PROCEDURE — 99214 OFFICE O/P EST MOD 30 MIN: CPT | Performed by: NURSE PRACTITIONER

## 2024-10-24 PROCEDURE — 3078F DIAST BP <80 MM HG: CPT | Performed by: NURSE PRACTITIONER

## 2024-10-24 PROCEDURE — G2211 COMPLEX E/M VISIT ADD ON: HCPCS | Performed by: NURSE PRACTITIONER

## 2024-10-24 PROCEDURE — 1159F MED LIST DOCD IN RCRD: CPT | Performed by: NURSE PRACTITIONER

## 2024-10-24 PROCEDURE — 3074F SYST BP LT 130 MM HG: CPT | Performed by: NURSE PRACTITIONER

## 2024-10-24 RX ORDER — TADALAFIL 5 MG/1
5 TABLET ORAL DAILY
Qty: 90 TABLET | Refills: 1 | Status: SHIPPED | OUTPATIENT
Start: 2024-10-24 | End: 2025-04-22

## 2024-10-24 RX ORDER — TADALAFIL 20 MG/1
20 TABLET ORAL
Qty: 90 TABLET | Refills: 1 | Status: SHIPPED | OUTPATIENT
Start: 2024-10-24 | End: 2025-10-24

## 2024-10-24 NOTE — PROGRESS NOTES
Subjective   Patient ID: Varghese Morales is a 68 y.o. male who presents for Follow-up (ED).  Presents for f/u of BPH and ED. He notes he feels this distally within the penis, mild irritation. He states this has become noticeable over the last few months.      PSA 1.06 in Sept 2024. Denies family history of breast cancer. Mother with breast cancer, sister with uterine cancer.      Currently taking tadalafil 20mg PO QOD. Increased difficulty with achieving orgasms. Not as effective as it using to be. Some benefit with urination.      NTF down to 1-2 from 3-4. Urination has been easier with this also. He is pleased. No further urinary irritation. Still somewhat bothered by nocturia.           Review of Systems   All other systems reviewed and are negative.      Objective   Physical Exam  Vitals reviewed.     Alert and oriented x3  Moist mucous membranes  Breathes easily on room air  Abdomen soft, nondistended  No edema  No scleral icterus  No focal neurological deficits  Appears stated age, no acute distress    Lab Results   Component Value Date    PSA 1.06 09/05/2024    PSA 1.10 09/25/2023    PSA 1.40 07/27/2021           Assessment/Plan   Diagnoses and all orders for this visit:  BPH with obstruction/lower urinary tract symptoms  -     tadalafil 20 mg tablet; Take 1 tablet (20 mg) by mouth every other day if needed for erectile dysfunction.  -     tadalafil (Cialis) 5 mg tablet; Take 1 tablet (5 mg) by mouth once daily.  Erectile disorder  -     tadalafil 20 mg tablet; Take 1 tablet (20 mg) by mouth every other day if needed for erectile dysfunction.    Plan to switch tadalafil to 5mg by mouth daily and continue with tadalafil 20mg PO every other day PRN. Encourgaed use of CARRILLO and constriction ring. Briefly discussed ICI. Given info if he would like to proceed. Patient is in agreement with plan         DOMINIQUE Renee-CNP 10/24/24 9:51 AM

## 2024-11-01 DIAGNOSIS — K21.9 GASTROESOPHAGEAL REFLUX DISEASE WITHOUT ESOPHAGITIS: ICD-10-CM

## 2024-11-01 RX ORDER — OMEPRAZOLE 20 MG/1
20 CAPSULE, DELAYED RELEASE ORAL DAILY
Qty: 30 CAPSULE | Refills: 2 | Status: SHIPPED | OUTPATIENT
Start: 2024-11-01 | End: 2025-01-30

## 2024-12-03 DIAGNOSIS — K21.9 GASTROESOPHAGEAL REFLUX DISEASE WITHOUT ESOPHAGITIS: ICD-10-CM

## 2024-12-03 RX ORDER — OMEPRAZOLE 20 MG/1
20 CAPSULE, DELAYED RELEASE ORAL DAILY
Qty: 30 CAPSULE | Refills: 2 | Status: SHIPPED | OUTPATIENT
Start: 2024-12-03 | End: 2025-03-03

## 2025-01-24 ENCOUNTER — APPOINTMENT (OUTPATIENT)
Dept: UROLOGY | Facility: CLINIC | Age: 70
End: 2025-01-24
Payer: MEDICARE

## 2025-01-24 ENCOUNTER — LAB (OUTPATIENT)
Dept: LAB | Facility: LAB | Age: 70
End: 2025-01-24
Payer: MEDICARE

## 2025-01-24 DIAGNOSIS — L12.1 MUCOUS MEMBRANE PEMPHIGOID (MULTI): ICD-10-CM

## 2025-01-24 LAB
BASOPHILS # BLD AUTO: 0.04 X10*3/UL (ref 0–0.1)
BASOPHILS NFR BLD AUTO: 0.6 %
EOSINOPHIL # BLD AUTO: 0.13 X10*3/UL (ref 0–0.7)
EOSINOPHIL NFR BLD AUTO: 2.1 %
ERYTHROCYTE [DISTWIDTH] IN BLOOD BY AUTOMATED COUNT: 13.5 % (ref 11.5–14.5)
HCT VFR BLD AUTO: 35.8 % (ref 41–52)
HGB BLD-MCNC: 11.5 G/DL (ref 13.5–17.5)
IMM GRANULOCYTES # BLD AUTO: 0.02 X10*3/UL (ref 0–0.7)
IMM GRANULOCYTES NFR BLD AUTO: 0.3 % (ref 0–0.9)
LYMPHOCYTES # BLD AUTO: 1.56 X10*3/UL (ref 1.2–4.8)
LYMPHOCYTES NFR BLD AUTO: 24.9 %
MCH RBC QN AUTO: 30.1 PG (ref 26–34)
MCHC RBC AUTO-ENTMCNC: 32.1 G/DL (ref 32–36)
MCV RBC AUTO: 94 FL (ref 80–100)
MONOCYTES # BLD AUTO: 0.75 X10*3/UL (ref 0.1–1)
MONOCYTES NFR BLD AUTO: 12 %
NEUTROPHILS # BLD AUTO: 3.77 X10*3/UL (ref 1.2–7.7)
NEUTROPHILS NFR BLD AUTO: 60.1 %
NRBC BLD-RTO: 0 /100 WBCS (ref 0–0)
PLATELET # BLD AUTO: 196 X10*3/UL (ref 150–450)
RBC # BLD AUTO: 3.82 X10*6/UL (ref 4.5–5.9)
WBC # BLD AUTO: 6.3 X10*3/UL (ref 4.4–11.3)

## 2025-01-24 PROCEDURE — 85025 COMPLETE CBC W/AUTO DIFF WBC: CPT

## 2025-01-27 ENCOUNTER — APPOINTMENT (OUTPATIENT)
Dept: DERMATOLOGY | Facility: CLINIC | Age: 70
End: 2025-01-27
Payer: MEDICARE

## 2025-01-27 DIAGNOSIS — L12.1 MUCOUS MEMBRANE PEMPHIGOID (MULTI): Primary | ICD-10-CM

## 2025-01-27 PROCEDURE — 1123F ACP DISCUSS/DSCN MKR DOCD: CPT | Performed by: DERMATOLOGY

## 2025-01-27 PROCEDURE — 1159F MED LIST DOCD IN RCRD: CPT | Performed by: DERMATOLOGY

## 2025-01-27 PROCEDURE — 99213 OFFICE O/P EST LOW 20 MIN: CPT | Performed by: DERMATOLOGY

## 2025-01-27 RX ORDER — DAPSONE 25 MG/1
75 TABLET ORAL DAILY
Qty: 270 TABLET | Refills: 3 | Status: SHIPPED | OUTPATIENT
Start: 2025-01-27 | End: 2026-01-22

## 2025-01-27 RX ORDER — CLOBETASOL PROPIONATE 0.05 MG/G
1 GEL TOPICAL DAILY
Qty: 60 G | Refills: 5 | Status: SHIPPED | OUTPATIENT
Start: 2025-01-27

## 2025-01-27 ASSESSMENT — DERMATOLOGY PATIENT ASSESSMENT
ARE YOU AN ORGAN TRANSPLANT RECIPIENT: NO
HAVE YOU HAD OR DO YOU HAVE A STAPH INFECTION: NO
DO YOU USE A TANNING BED: NO
HAVE YOU HAD OR DO YOU HAVE VASCULAR DISEASE: NO
DO YOU HAVE ANY NEW OR CHANGING LESIONS: NO
DO YOU USE SUNSCREEN: OCCASIONALLY

## 2025-01-27 ASSESSMENT — DERMATOLOGY QUALITY OF LIFE (QOL) ASSESSMENT
RATE HOW BOTHERED YOU ARE BY SYMPTOMS OF YOUR SKIN PROBLEM (EG, ITCHING, STINGING BURNING, HURTING OR SKIN IRRITATION): 0 - NEVER BOTHERED
DATE THE QUALITY-OF-LIFE ASSESSMENT WAS COMPLETED: 67232
RATE HOW BOTHERED YOU ARE BY EFFECTS OF YOUR SKIN PROBLEMS ON YOUR ACTIVITIES (EG, GOING OUT, ACCOMPLISHING WHAT YOU WANT, WORK ACTIVITIES OR YOUR RELATIONSHIPS WITH OTHERS): 0 - NEVER BOTHERED
RATE HOW EMOTIONALLY BOTHERED YOU ARE BY YOUR SKIN PROBLEM (FOR EXAMPLE, WORRY, EMBARRASSMENT, FRUSTRATION): 0 - NEVER BOTHERED
ARE THERE EXCLUSIONS OR EXCEPTIONS FOR THE QUALITY OF LIFE ASSESSMENT: NO

## 2025-01-27 ASSESSMENT — ITCH NUMERIC RATING SCALE: HOW SEVERE IS YOUR ITCHING?: 0

## 2025-01-27 NOTE — PROGRESS NOTES
Subjective     Varghese Morales is a 69 y.o. male who presents for the following: mucus membrane pemphigoid.     Patient presents for 4 month follow up visit. At his last clinic visit in September 2024 his dose of dapsone was decreased to 75mg daily. He reports he has been tolerating this well without any flares. He denies any adverse effects of dapsone. He is also using clobetasol ointment once weekly applied via dental trays for 10 minutes at a time.    Review of Systems:  No other skin or systemic complaints other than what is documented elsewhere in the note.    The following portions of the chart were reviewed this encounter and updated as appropriate:          Skin Cancer History  No skin cancer on file.      Specialty Problems          Dermatology Problems    Disorder of skin and subcutaneous tissue    Mucous membrane pemphigoid (Multi)    Scar    Traumatic open wound of right lower leg        Objective   Well appearing patient in no apparent distress; mood and affect are within normal limits.    A focused skin examination was performed. All findings within normal limits unless otherwise noted below.    Assessment/Plan   1. Mucous membrane pemphigoid (Multi)  Oropharyngeal exam without notable erosions     Mucous Membrane Pemphigoid- Well-controlled  - The autoimmune etiology of this diagnosis and treatment options were extensively discussed with the patient. Diagnosis is confirmed with DIF on 1/2022.  - Currently on prednisone Dapsone 75 mg (decreased from 100 mg in 9/2024)  - Currently using clobetasol ointment applied via dental trays once weekly for 10 minutes  - Had eye exam with Dr. Baker 3/2023 which was unremarkable  - CBC with diff 1/2025 showing stable anemia (Hgb 11.5). There are no contraindications to continue treatment at this time. Continue [id number] monitoring.   - RTC in 6 months      Related Procedures  Follow Up In Dermatology - Established Patient  Follow Up In Dermatology - Established  Patient    Related Medications  clobetasol (Temovate) 0.05 % gel  Apply 1 Application topically once daily.    dapsone 25 mg tablet  Take 3 tablets (75 mg) by mouth once daily.    Nolan Turcios MD  PGY-3, Department of Dermatology    I saw and evaluated the patient. I personally obtained the key and critical portions of the history and physical exam or was physically present for key and critical portions performed by the resident/fellow. I reviewed the resident/fellow's documentation and discussed the patient with the resident/fellow. I agree with the resident/fellow's medical decision making as documented in the note.    Mika Triana MD PhD

## 2025-02-18 ENCOUNTER — APPOINTMENT (OUTPATIENT)
Dept: PRIMARY CARE | Facility: CLINIC | Age: 70
End: 2025-02-18
Payer: MEDICARE

## 2025-02-20 ENCOUNTER — APPOINTMENT (OUTPATIENT)
Dept: PRIMARY CARE | Facility: CLINIC | Age: 70
End: 2025-02-20
Payer: MEDICARE

## 2025-02-20 VITALS
TEMPERATURE: 97.9 F | HEART RATE: 50 BPM | BODY MASS INDEX: 30.92 KG/M2 | WEIGHT: 216 LBS | RESPIRATION RATE: 16 BRPM | SYSTOLIC BLOOD PRESSURE: 138 MMHG | HEIGHT: 70 IN | OXYGEN SATURATION: 98 % | DIASTOLIC BLOOD PRESSURE: 70 MMHG

## 2025-02-20 DIAGNOSIS — E78.2 MIXED HYPERLIPIDEMIA: ICD-10-CM

## 2025-02-20 DIAGNOSIS — Z12.5 PROSTATE CANCER SCREENING: ICD-10-CM

## 2025-02-20 DIAGNOSIS — Z00.00 MEDICARE ANNUAL WELLNESS VISIT, SUBSEQUENT: Primary | ICD-10-CM

## 2025-02-20 PROCEDURE — G0439 PPPS, SUBSEQ VISIT: HCPCS | Performed by: INTERNAL MEDICINE

## 2025-02-20 PROCEDURE — 1170F FXNL STATUS ASSESSED: CPT | Performed by: INTERNAL MEDICINE

## 2025-02-20 PROCEDURE — 1158F ADVNC CARE PLAN TLK DOCD: CPT | Performed by: INTERNAL MEDICINE

## 2025-02-20 PROCEDURE — 1123F ACP DISCUSS/DSCN MKR DOCD: CPT | Performed by: INTERNAL MEDICINE

## 2025-02-20 PROCEDURE — 3008F BODY MASS INDEX DOCD: CPT | Performed by: INTERNAL MEDICINE

## 2025-02-20 PROCEDURE — 99214 OFFICE O/P EST MOD 30 MIN: CPT | Performed by: INTERNAL MEDICINE

## 2025-02-20 PROCEDURE — 1159F MED LIST DOCD IN RCRD: CPT | Performed by: INTERNAL MEDICINE

## 2025-02-20 PROCEDURE — 3075F SYST BP GE 130 - 139MM HG: CPT | Performed by: INTERNAL MEDICINE

## 2025-02-20 PROCEDURE — 1036F TOBACCO NON-USER: CPT | Performed by: INTERNAL MEDICINE

## 2025-02-20 PROCEDURE — 1160F RVW MEDS BY RX/DR IN RCRD: CPT | Performed by: INTERNAL MEDICINE

## 2025-02-20 PROCEDURE — 3078F DIAST BP <80 MM HG: CPT | Performed by: INTERNAL MEDICINE

## 2025-02-20 ASSESSMENT — ACTIVITIES OF DAILY LIVING (ADL)
DOING_HOUSEWORK: INDEPENDENT
TAKING_MEDICATION: INDEPENDENT
DRESSING: INDEPENDENT
MANAGING_FINANCES: INDEPENDENT
GROCERY_SHOPPING: INDEPENDENT
BATHING: INDEPENDENT

## 2025-02-20 ASSESSMENT — ENCOUNTER SYMPTOMS
DIARRHEA: 0
SLEEP DISTURBANCE: 0
CONSTIPATION: 0
PALPITATIONS: 1
ABDOMINAL PAIN: 0

## 2025-02-20 NOTE — PROGRESS NOTES
Patient here for a medicare wellness visit and follow up    Subjective   Patient ID: Varghese Morales is a 69 y.o. male who presents for Follow-up and Medicare Annual Wellness Visit Subsequent.    The patient reports occasional palpitations occurring about twice a year.  He suspects that this is related to heavier meals, or drinking wine.  The patient believes that the last bout occurred in 11/2024.  He is currently maintained with propanolol 40mg twice daily for essential tremor.  The patient follows annually with  from Cardiology.  He continues to take atorvastatin 40mg once nightly with good compliance.    The patient continues to follow with  from Dermatology for a history of mucous membrane pemphigoid treated with dapsone 75mg once daily.  He states that the condition is stable overall.    The patient has been taking tadalafil 5mg once daily, and finds that this is helping with lower urinary tract symptoms.  Nevertheless, he continues to experience nocturia of two times per evening.  The tadalafil 20mg once daily as needed has also been helpful for erectile dysfunction.  The patient is following with Violeta SALTER from Urology.    The patient denies any hearing impairment or vision changes.  He follows regularly with a Dentist, and reports good sleep quality.  The patient denies any abdominal pain or bowel problems.     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  He also enjoys participating in [employer].      Review of Systems   HENT:  Negative for hearing loss.    Eyes:  Negative for visual disturbance.   Cardiovascular:  Positive for palpitations.   Gastrointestinal:  Negative for abdominal pain, constipation and diarrhea.   Genitourinary:         Positive for nocturia of two times per evening.   Psychiatric/Behavioral:  Negative for sleep disturbance.        Objective   Physical Exam  Constitutional:       Appearance: Normal appearance.   Neck:      Vascular:  No carotid bruit.   Cardiovascular:      Rate and Rhythm: Normal rate and regular rhythm.      Heart sounds: Normal heart sounds.   Pulmonary:      Effort: Pulmonary effort is normal.      Breath sounds: Normal breath sounds.   Abdominal:      General: Bowel sounds are normal.      Palpations: Abdomen is soft.      Tenderness: There is no abdominal tenderness.   Skin:     General: Skin is warm and dry.   Neurological:      General: No focal deficit present.      Mental Status: He is alert and oriented to person, place, and time. Mental status is at baseline.   Psychiatric:         Mood and Affect: Mood normal.         Behavior: Behavior normal.         Assessment/Plan   Problem List Items Addressed This Visit             ICD-10-CM    Hyperlipidemia - Primary E78.5    Relevant Orders    Lipid panel    CBC and Auto Differential    Comprehensive metabolic panel     Other Visit Diagnoses         Codes    Prostate cancer screening     Z12.5            Medicare Wellness Examination Done  -  Discussed healthy diet and regular exercise.    -  Physical exam overall unremarkable. Immunizations reviewed and updated accordingly. Healthy lifestyle choices discussed (tobacco avoidance, appropriate alcohol use, avoidance of illicit substances).   -  Patient is wearing seatbelt.   -  Screening lab work ordered as indicated.    -  Age appropriate screening tests reviewed with patient.       IMPRESSION/PLAN:      Palpitations  - Very infrequent.  Monitor for now.  Advised patient to call the clinic if frequency increases, or he develops symptoms, and will order Zio patch.  He is going to set up his follow-up with cardiology as well.    HTN   - /70 in the office today.  Continue taking lisinopril 20 mg QD. Follows with Dr. Moura in Cardiology.      HLD   - Stable. Takes Atorvastatin 40 mg QD.      Essential Tremor   - Continue Propranolol 40mg BID. Follows with Dr. Rea in Neurology.     Lung Nodule  - Last CT Chest 10/2021  showed Stable bilateral nodular opacities. No new pulmonary nodules  identified. No acute cardiopulmonary process.      GERD  - Prescribed omeprazole 20mg once daily.  Monitor for now.  Call the clinic if symptoms persist or worsen.    BPH/ED   - Following with Urology.     Mucous Membrane Pemphigoid  - Maintained with dapsone 25mg as three tablets once daily.  Following with  from Dermatology.    Health Maintenance   - Routine labs ordered including CBC, CMP, and a lipid panel to be completed in the fasting state. Last PSA wnl 9/2024. Last colonoscopy 5/2021, repeat due 5/2026.     Follow up in 6 months, call sooner if needed.        Scribe Attestation  By signing my name below, I, Cale De La Torre   attest that this documentation has been prepared under the direction and in the presence of Cristi Cesar DO.   Sandy Contreras 02/20/25 3:09 PM

## 2025-03-18 ENCOUNTER — APPOINTMENT (OUTPATIENT)
Dept: UROLOGY | Facility: CLINIC | Age: 70
End: 2025-03-18
Payer: MEDICARE

## 2025-03-25 ENCOUNTER — APPOINTMENT (OUTPATIENT)
Dept: UROLOGY | Facility: CLINIC | Age: 70
End: 2025-03-25
Payer: MEDICARE

## 2025-03-25 VITALS — DIASTOLIC BLOOD PRESSURE: 80 MMHG | SYSTOLIC BLOOD PRESSURE: 136 MMHG | HEART RATE: 51 BPM

## 2025-03-25 DIAGNOSIS — R06.83 SNORING: ICD-10-CM

## 2025-03-25 DIAGNOSIS — N13.8 BPH WITH OBSTRUCTION/LOWER URINARY TRACT SYMPTOMS: Primary | ICD-10-CM

## 2025-03-25 DIAGNOSIS — N40.1 BPH WITH OBSTRUCTION/LOWER URINARY TRACT SYMPTOMS: Primary | ICD-10-CM

## 2025-03-25 PROCEDURE — 99214 OFFICE O/P EST MOD 30 MIN: CPT | Performed by: NURSE PRACTITIONER

## 2025-03-25 PROCEDURE — 3075F SYST BP GE 130 - 139MM HG: CPT | Performed by: NURSE PRACTITIONER

## 2025-03-25 PROCEDURE — 1159F MED LIST DOCD IN RCRD: CPT | Performed by: NURSE PRACTITIONER

## 2025-03-25 PROCEDURE — 1123F ACP DISCUSS/DSCN MKR DOCD: CPT | Performed by: NURSE PRACTITIONER

## 2025-03-25 PROCEDURE — 1036F TOBACCO NON-USER: CPT | Performed by: NURSE PRACTITIONER

## 2025-03-25 PROCEDURE — 3079F DIAST BP 80-89 MM HG: CPT | Performed by: NURSE PRACTITIONER

## 2025-03-25 NOTE — PROGRESS NOTES
Subjective   Patient ID: Varghese Morales is a 69 y.o. male who presents for Follow-up (ED).    Presents for f/u of BPH and ED. He notes he feels this distally within the penis, mild irritation. He states this has become noticeable over the last few months.      PSA 1.06 in Sept 2024. Denies family history of breast cancer. Mother with breast cancer, sister with uterine cancer.      Currently taking tadalafil 20mg PO QOD. Increased difficulty with achieving orgasms. Not as effective as it using to be. Some benefit with urination. Partner with minimal libido, bothersome to him.      NTF down to 1-2 from 3-4. Urination has been easier with this also. He is pleased. No further urinary irritation. Still somewhat bothered by nocturia. Pleased with improvement. Drinks herbal tea before bed.    Sleep study completed a few years ago, negative for ILDA. Some snoring, correlation with weight.                 Review of Systems   All other systems reviewed and are negative.      Objective   Physical Exam  Vitals reviewed.     Alert and oriented x3  Moist mucous membranes  Breathes easily on room air  Abdomen soft, nondistended. Obese  No edema  No scleral icterus  No focal neurological deficits  Appears stated age, no acute distress    Lab Results   Component Value Date    PSA 1.06 09/05/2024    PSA 1.10 09/25/2023    PSA 1.40 07/27/2021       Assessment/Plan   Diagnoses and all orders for this visit:  BPH with obstruction/lower urinary tract symptoms  -     PSA; Future  Snoring  -     Home sleep apnea test (HSAT); Future    Plan for annual PSA in Sept. Will complete home sleep study next available for follow up. Plan for 6 month follow up or sooner if needed.        Violeta Guzmán, DOMINIQUE-CNP 03/25/25 11:24 AM

## 2025-06-03 ENCOUNTER — APPOINTMENT (OUTPATIENT)
Dept: NEUROLOGY | Facility: CLINIC | Age: 70
End: 2025-06-03
Payer: MEDICARE

## 2025-06-03 VITALS
WEIGHT: 212 LBS | HEART RATE: 54 BPM | HEIGHT: 73 IN | SYSTOLIC BLOOD PRESSURE: 131 MMHG | BODY MASS INDEX: 28.1 KG/M2 | DIASTOLIC BLOOD PRESSURE: 74 MMHG

## 2025-06-03 DIAGNOSIS — G25.0 ESSENTIAL TREMOR: Primary | ICD-10-CM

## 2025-06-03 PROCEDURE — 3008F BODY MASS INDEX DOCD: CPT | Performed by: PSYCHIATRY & NEUROLOGY

## 2025-06-03 PROCEDURE — 1160F RVW MEDS BY RX/DR IN RCRD: CPT | Performed by: PSYCHIATRY & NEUROLOGY

## 2025-06-03 PROCEDURE — 1036F TOBACCO NON-USER: CPT | Performed by: PSYCHIATRY & NEUROLOGY

## 2025-06-03 PROCEDURE — 1159F MED LIST DOCD IN RCRD: CPT | Performed by: PSYCHIATRY & NEUROLOGY

## 2025-06-03 PROCEDURE — G2211 COMPLEX E/M VISIT ADD ON: HCPCS | Performed by: PSYCHIATRY & NEUROLOGY

## 2025-06-03 PROCEDURE — 99213 OFFICE O/P EST LOW 20 MIN: CPT | Performed by: PSYCHIATRY & NEUROLOGY

## 2025-06-03 PROCEDURE — 3078F DIAST BP <80 MM HG: CPT | Performed by: PSYCHIATRY & NEUROLOGY

## 2025-06-03 PROCEDURE — 3075F SYST BP GE 130 - 139MM HG: CPT | Performed by: PSYCHIATRY & NEUROLOGY

## 2025-06-03 RX ORDER — PROPRANOLOL HYDROCHLORIDE 10 MG/1
10 TABLET ORAL 2 TIMES DAILY
Qty: 180 TABLET | Refills: 3 | Status: SHIPPED | OUTPATIENT
Start: 2025-06-03 | End: 2026-06-03

## 2025-06-03 RX ORDER — PROPRANOLOL HYDROCHLORIDE 20 MG/1
40 TABLET ORAL 2 TIMES DAILY
Qty: 360 TABLET | Refills: 3 | Status: SHIPPED | OUTPATIENT
Start: 2025-06-03 | End: 2026-06-03

## 2025-06-03 ASSESSMENT — FAHN TOLOSA MARTIN TREMOR (FTM)
HEAD TOTAL SCORE: 0
TOUNGE WHEN PROTUDED: 1
UE ARMS OUTSTRECHED WRISTS MILDLY EXTENDED FINGERS SPREAD APART: 2
UE FINGER TO NOSE AND OTHER ACTIONS: 2
HANDWRITING WITH DOMINANT HAND: 1
TOUNGE TOTAL SCORE: 1
LE AT REST: 0
UE ARMS OUTSTRECHED WRISTS MILDLY EXTENDED FINGERS SPREAD APART: 1
LLE TOTAL SCORE: 0
DRAWING A LEFT SCORE: 1
DRAWING B LEFT SCORE: 3
LE LEG FLEXED AT HIPS AND KNEES AT POSTURE: 0
TRUNK  WHEN SITTING OR STANDING: 0
DRAWING B RGHT SCORE: 2
VOICE TOTAL SCORE: 1
UE FINGER TO NOSE AND OTHER ACTIONS: 2
DRAWING B TOTAL SCORE: 5
LE LEG FLEXED AT HIPS AND KNEES AT POSTURE: 0
TOUNGE AT REST: 0
TRUNK TOTAL SCORE: 0
LE AT REST: 0
DRAWING A TOTAL SCORE: 2
PART A SUBTOTAL SCORE: 10
RUE TOTAL SCORE: 3
PART B SUBTOTAL SCORE: 10
FACE AT POSTURE WHEN STANDING OR SITTING: 0
LUE TOTAL SCORE: 4
LE TOE TO FINGER IN A FLEXED POSTURE: 0
FACE TOTAL: 0
POURING SCORE: 0
RLE TOTAL SCORE: 1
FACE IN REPOSE: 0
DRAWING C LEFT SCORE: 1
UE ARM AT REST: 0
DRAWING A RIGHT SCORE: 1
VOICE IN ACTION: 1
HEAD IN REPOSE: 0
UE ARM AT REST: 0
DRAWING C RIGHT SCORE: 1
LE TOE TO FINGER IN A FLEXED POSTURE: 1
HEAD AT POSTURE WHEN STANDING OR SITTING: 0
DRAWING C TOTAL SCORE: 2
TRUNK IN REPOSE: 0

## 2025-06-03 ASSESSMENT — ANXIETY QUESTIONNAIRES
1. FEELING NERVOUS, ANXIOUS, OR ON EDGE: NOT AT ALL
7. FEELING AFRAID AS IF SOMETHING AWFUL MIGHT HAPPEN: NOT AT ALL
GAD7 TOTAL SCORE: 0
2. NOT BEING ABLE TO STOP OR CONTROL WORRYING: NOT AT ALL
5. BEING SO RESTLESS THAT IT IS HARD TO SIT STILL: NOT AT ALL
4. TROUBLE RELAXING: NOT AT ALL
3. WORRYING TOO MUCH ABOUT DIFFERENT THINGS: NOT AT ALL
6. BECOMING EASILY ANNOYED OR IRRITABLE: NOT AT ALL
IF YOU CHECKED OFF ANY PROBLEMS ON THIS QUESTIONNAIRE, HOW DIFFICULT HAVE THESE PROBLEMS MADE IT FOR YOU TO DO YOUR WORK, TAKE CARE OF THINGS AT HOME, OR GET ALONG WITH OTHER PEOPLE: NOT DIFFICULT AT ALL

## 2025-06-03 ASSESSMENT — ENCOUNTER SYMPTOMS
OCCASIONAL FEELINGS OF UNSTEADINESS: 0
DEPRESSION: 0
LOSS OF SENSATION IN FEET: 0

## 2025-06-03 ASSESSMENT — PATIENT HEALTH QUESTIONNAIRE - PHQ9
1. LITTLE INTEREST OR PLEASURE IN DOING THINGS: NOT AT ALL
2. FEELING DOWN, DEPRESSED OR HOPELESS: NOT AT ALL
SUM OF ALL RESPONSES TO PHQ9 QUESTIONS 1 AND 2: 0

## 2025-06-03 NOTE — PATIENT INSTRUCTIONS
It was nice to see you today. Essential tremor.     1, continue propranolol 40 mg bid, I have given you a small script of 10 mg twice a day, for days that you want to take 30 mg twice a day or you want to alternate doses.   2. RTC in one year
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
## 2025-06-06 ENCOUNTER — APPOINTMENT (OUTPATIENT)
Dept: SLEEP MEDICINE | Facility: CLINIC | Age: 70
End: 2025-06-06
Payer: MEDICARE

## 2025-06-09 ENCOUNTER — APPOINTMENT (OUTPATIENT)
Dept: SLEEP MEDICINE | Facility: CLINIC | Age: 70
End: 2025-06-09
Payer: MEDICARE

## 2025-06-10 DIAGNOSIS — N13.8 BPH WITH OBSTRUCTION/LOWER URINARY TRACT SYMPTOMS: ICD-10-CM

## 2025-06-10 DIAGNOSIS — N40.1 BPH WITH OBSTRUCTION/LOWER URINARY TRACT SYMPTOMS: ICD-10-CM

## 2025-06-10 RX ORDER — TADALAFIL 5 MG/1
5 TABLET ORAL DAILY
Qty: 90 TABLET | Refills: 1 | Status: SHIPPED | OUTPATIENT
Start: 2025-06-10 | End: 2025-12-07

## 2025-06-11 ENCOUNTER — TELEPHONE (OUTPATIENT)
Dept: UROLOGY | Facility: CLINIC | Age: 70
End: 2025-06-11

## 2025-06-16 DIAGNOSIS — N40.1 BPH WITH OBSTRUCTION/LOWER URINARY TRACT SYMPTOMS: ICD-10-CM

## 2025-06-16 DIAGNOSIS — N13.8 BPH WITH OBSTRUCTION/LOWER URINARY TRACT SYMPTOMS: ICD-10-CM

## 2025-06-16 RX ORDER — TADALAFIL 5 MG/1
5 TABLET ORAL DAILY
Qty: 90 TABLET | Refills: 1 | Status: SHIPPED | OUTPATIENT
Start: 2025-06-16 | End: 2025-12-13

## 2025-06-22 DIAGNOSIS — I10 PRIMARY HYPERTENSION: ICD-10-CM

## 2025-06-23 RX ORDER — LISINOPRIL 20 MG/1
20 TABLET ORAL DAILY
Qty: 90 TABLET | Refills: 2 | Status: SHIPPED | OUTPATIENT
Start: 2025-06-23

## 2025-07-10 DIAGNOSIS — E78.5 HYPERLIPIDEMIA, UNSPECIFIED HYPERLIPIDEMIA TYPE: ICD-10-CM

## 2025-07-10 DIAGNOSIS — R93.1 AGATSTON CORONARY ARTERY CALCIUM SCORE GREATER THAN 400: ICD-10-CM

## 2025-07-11 LAB
ALBUMIN SERPL-MCNC: 4.2 G/DL (ref 3.6–5.1)
ALP SERPL-CCNC: 67 U/L (ref 35–144)
ALT SERPL-CCNC: 15 U/L (ref 9–46)
ANION GAP SERPL CALCULATED.4IONS-SCNC: 5 MMOL/L (CALC) (ref 7–17)
AST SERPL-CCNC: 14 U/L (ref 10–35)
BASOPHILS # BLD AUTO: 38 CELLS/UL (ref 0–200)
BASOPHILS NFR BLD AUTO: 0.7 %
BILIRUB SERPL-MCNC: 1 MG/DL (ref 0.2–1.2)
BUN SERPL-MCNC: 22 MG/DL (ref 7–25)
CALCIUM SERPL-MCNC: 9.2 MG/DL (ref 8.6–10.3)
CHLORIDE SERPL-SCNC: 102 MMOL/L (ref 98–110)
CHOLEST SERPL-MCNC: 115 MG/DL
CHOLEST/HDLC SERPL: 2.5 (CALC)
CO2 SERPL-SCNC: 28 MMOL/L (ref 20–32)
CREAT SERPL-MCNC: 1.02 MG/DL (ref 0.7–1.35)
EGFRCR SERPLBLD CKD-EPI 2021: 80 ML/MIN/1.73M2
EOSINOPHIL # BLD AUTO: 108 CELLS/UL (ref 15–500)
EOSINOPHIL NFR BLD AUTO: 2 %
ERYTHROCYTE [DISTWIDTH] IN BLOOD BY AUTOMATED COUNT: 12.6 % (ref 11–15)
GLUCOSE SERPL-MCNC: 99 MG/DL (ref 65–99)
HCT VFR BLD AUTO: 39.4 % (ref 38.5–50)
HDLC SERPL-MCNC: 46 MG/DL
HGB BLD-MCNC: 12.4 G/DL (ref 13.2–17.1)
LDLC SERPL CALC-MCNC: 54 MG/DL (CALC)
LYMPHOCYTES # BLD AUTO: 1166 CELLS/UL (ref 850–3900)
LYMPHOCYTES NFR BLD AUTO: 21.6 %
MCH RBC QN AUTO: 30.6 PG (ref 27–33)
MCHC RBC AUTO-ENTMCNC: 31.5 G/DL (ref 32–36)
MCV RBC AUTO: 97.3 FL (ref 80–100)
MONOCYTES # BLD AUTO: 421 CELLS/UL (ref 200–950)
MONOCYTES NFR BLD AUTO: 7.8 %
NEUTROPHILS # BLD AUTO: 3667 CELLS/UL (ref 1500–7800)
NEUTROPHILS NFR BLD AUTO: 67.9 %
NONHDLC SERPL-MCNC: 69 MG/DL (CALC)
PLATELET # BLD AUTO: 203 THOUSAND/UL (ref 140–400)
PMV BLD REES-ECKER: 12.4 FL (ref 7.5–12.5)
POTASSIUM SERPL-SCNC: 4.8 MMOL/L (ref 3.5–5.3)
PROT SERPL-MCNC: 6.8 G/DL (ref 6.1–8.1)
RBC # BLD AUTO: 4.05 MILLION/UL (ref 4.2–5.8)
SODIUM SERPL-SCNC: 135 MMOL/L (ref 135–146)
TRIGL SERPL-MCNC: 74 MG/DL
WBC # BLD AUTO: 5.4 THOUSAND/UL (ref 3.8–10.8)

## 2025-07-14 ENCOUNTER — APPOINTMENT (OUTPATIENT)
Dept: DERMATOLOGY | Facility: CLINIC | Age: 70
End: 2025-07-14
Payer: MEDICARE

## 2025-07-14 RX ORDER — ATORVASTATIN CALCIUM 40 MG/1
40 TABLET, FILM COATED ORAL NIGHTLY
Qty: 90 TABLET | Refills: 3 | Status: SHIPPED | OUTPATIENT
Start: 2025-07-14

## 2025-07-18 ENCOUNTER — APPOINTMENT (OUTPATIENT)
Dept: DERMATOLOGY | Facility: CLINIC | Age: 70
End: 2025-07-18
Payer: MEDICARE

## 2025-07-18 DIAGNOSIS — L12.1 MUCOUS MEMBRANE PEMPHIGOID: ICD-10-CM

## 2025-07-18 DIAGNOSIS — R20.9 DISTURBANCE OF SKIN SENSATION: Primary | ICD-10-CM

## 2025-07-18 PROCEDURE — 99213 OFFICE O/P EST LOW 20 MIN: CPT | Performed by: DERMATOLOGY

## 2025-07-18 PROCEDURE — 1159F MED LIST DOCD IN RCRD: CPT | Performed by: DERMATOLOGY

## 2025-07-18 RX ORDER — DAPSONE 25 MG/1
75 TABLET ORAL DAILY
Qty: 270 TABLET | Refills: 11 | Status: SHIPPED | OUTPATIENT
Start: 2025-07-18 | End: 2025-07-18 | Stop reason: SDUPTHER

## 2025-07-18 RX ORDER — DAPSONE 25 MG/1
75 TABLET ORAL DAILY
Qty: 270 TABLET | Refills: 3 | Status: SHIPPED | OUTPATIENT
Start: 2025-07-18 | End: 2026-07-13

## 2025-07-18 ASSESSMENT — PATIENT GLOBAL ASSESSMENT (PGA): PATIENT GLOBAL ASSESSMENT: PATIENT GLOBAL ASSESSMENT:  2 - MILD

## 2025-07-18 ASSESSMENT — DERMATOLOGY QUALITY OF LIFE (QOL) ASSESSMENT
RATE HOW BOTHERED YOU ARE BY EFFECTS OF YOUR SKIN PROBLEMS ON YOUR ACTIVITIES (EG, GOING OUT, ACCOMPLISHING WHAT YOU WANT, WORK ACTIVITIES OR YOUR RELATIONSHIPS WITH OTHERS): 2
RATE HOW BOTHERED YOU ARE BY SYMPTOMS OF YOUR SKIN PROBLEM (EG, ITCHING, STINGING BURNING, HURTING OR SKIN IRRITATION): 2
DATE THE QUALITY-OF-LIFE ASSESSMENT WAS COMPLETED: 67404
RATE HOW EMOTIONALLY BOTHERED YOU ARE BY YOUR SKIN PROBLEM (FOR EXAMPLE, WORRY, EMBARRASSMENT, FRUSTRATION): 2
ARE THERE EXCLUSIONS OR EXCEPTIONS FOR THE QUALITY OF LIFE ASSESSMENT: NO

## 2025-07-18 ASSESSMENT — ITCH NUMERIC RATING SCALE: HOW SEVERE IS YOUR ITCHING?: 0

## 2025-07-18 ASSESSMENT — DERMATOLOGY PATIENT ASSESSMENT: DO YOU HAVE ANY NEW OR CHANGING LESIONS: NO

## 2025-07-18 NOTE — PROGRESS NOTES
"Shabbir Morales \"Glen" is a 69 y.o. male who presents for the following: Mucous membrane pemphigoid (Multi) (No issues today.) and Burning sensation  (Pubic Area and Right Groin, Pt denies itching or redness, but reports Pain and tingling. Pt states that he swims daily.). Tingling sensation first noticed a couple weeks ago after swimming or taking a shower, now occurring more often for 25 minutes at a time.    Intake Questions  Do you have any new or changing Lesions?: No    Review of Systems:  No other skin or systemic complaints other than what is documented elsewhere in the note.    The following portions of the chart were reviewed this encounter and updated as appropriate:          Skin Cancer History  Biopsy Log Book  No skin cancers from Specimen Tracking.    Additional History      Specialty Problems          Dermatology Problems    Disorder of skin and subcutaneous tissue    Mucous membrane pemphigoid    Scar    Traumatic open wound of right lower leg        Objective   Well appearing patient in no apparent distress; mood and affect are within normal limits.    A focused skin examination was performed. All findings within normal limits unless otherwise noted below.    Assessment/Plan   Skin Exam  1. MUCOUS MEMBRANE PEMPHIGOID  Generalized  Oropharyngeal exam without notable erosions or inflammation  Mucous Membrane Pemphigoid- Well-controlled  - Diagnosis is confirmed with DIF on 1/2022  - Currently on Dapsone 75 mg (decreased from 100 mg in 9/2024), CONTINUE this dose  - Currently using clobetasol ointment applied via dental trays once weekly for 10 minutes, recommend to use this as needed  - Had eye exam with Dr. Baker 3/2023 which was unremarkable, recommended to follow up with primary ophthalmologist yearly and has an appointment in the next few months  - CBC with diff 7/2025 showing stable anemia (Hgb 12.4). There are no contraindications to continue treatment at this time. Advised to " obtain CBC with diff and CBC prior to next appointment, orders placed  - RTC in 5 months    This Visit  - Follow Up In Dermatology - Established Patient  - Follow Up In Dermatology - Established Patient  - CBC and Auto Differential  - Comprehensive metabolic panel  - dapsone 25 mg tablet - Take 3 tablets (75 mg) by mouth once daily.  Existing Treatments  - clobetasol (Temovate) 0.05 % gel - Apply 1 Application topically once daily.  2. DISTURBANCE OF SKIN SENSATION  Pubic  No evidence of inflammation on the skin  - Patient reports burning and tingling sensation of the suprapubic and right inguinal skin  - He swims every day and is wondering if this is related to the chlorine or possibly an underlying malignancy  - Reviewed that there is no concern for a malignant process and no dermatologic condition, but can apply ice packs or OTC anti itch creams to help minimize the sensation    Eliana Calixto MD  PGY4, Dermatology    I saw and evaluated the patient. I personally obtained the key and critical portions of the history and physical exam or was physically present for key and critical portions performed by the resident/fellow. I reviewed the resident/fellow's documentation and discussed the patient with the resident/fellow. I agree with the resident/fellow's medical decision making as documented in the note.    Mika Triana MD PhD

## 2025-07-18 NOTE — Clinical Note
- Patient reports burning and tingling sensation of the suprapubic and right inguinal skin  - He swims every day and is wondering if this is related to the chlorine or possibly an underlying malignancy  - Reviewed that there is no concern for a malignant process and no dermatologic condition, but can apply ice packs or OTC anti itch creams to help minimize the sensation

## 2025-07-18 NOTE — Clinical Note
Mucous Membrane Pemphigoid- Well-controlled  - Diagnosis is confirmed with DIF on 1/2022  - Currently on Dapsone 75 mg (decreased from 100 mg in 9/2024), CONTINUE this dose  - Currently using clobetasol ointment applied via dental trays once weekly for 10 minutes, recommend to use this as needed  - Had eye exam with Dr. Baker 3/2023 which was unremarkable, recommended to follow up with primary ophthalmologist yearly and has an appointment in the next few months  - CBC with diff 7/2025 showing stable anemia (Hgb 12.4). There are no contraindications to continue treatment at this time. Advised to obtain CBC with diff and CBC prior to next appointment, orders placed  - RTC in 5 months

## 2025-07-23 ENCOUNTER — OFFICE VISIT (OUTPATIENT)
Dept: PRIMARY CARE | Facility: CLINIC | Age: 70
End: 2025-07-23
Payer: MEDICARE

## 2025-07-23 VITALS
DIASTOLIC BLOOD PRESSURE: 82 MMHG | OXYGEN SATURATION: 95 % | SYSTOLIC BLOOD PRESSURE: 150 MMHG | BODY MASS INDEX: 28.39 KG/M2 | TEMPERATURE: 97.8 F | HEART RATE: 58 BPM | WEIGHT: 215.2 LBS

## 2025-07-23 DIAGNOSIS — R10.31 RIGHT GROIN PAIN: Primary | ICD-10-CM

## 2025-07-23 PROCEDURE — 99214 OFFICE O/P EST MOD 30 MIN: CPT | Performed by: STUDENT IN AN ORGANIZED HEALTH CARE EDUCATION/TRAINING PROGRAM

## 2025-07-23 PROCEDURE — 1160F RVW MEDS BY RX/DR IN RCRD: CPT | Performed by: STUDENT IN AN ORGANIZED HEALTH CARE EDUCATION/TRAINING PROGRAM

## 2025-07-23 PROCEDURE — 3077F SYST BP >= 140 MM HG: CPT | Performed by: STUDENT IN AN ORGANIZED HEALTH CARE EDUCATION/TRAINING PROGRAM

## 2025-07-23 PROCEDURE — 1159F MED LIST DOCD IN RCRD: CPT | Performed by: STUDENT IN AN ORGANIZED HEALTH CARE EDUCATION/TRAINING PROGRAM

## 2025-07-23 PROCEDURE — 3079F DIAST BP 80-89 MM HG: CPT | Performed by: STUDENT IN AN ORGANIZED HEALTH CARE EDUCATION/TRAINING PROGRAM

## 2025-07-23 ASSESSMENT — ENCOUNTER SYMPTOMS
DYSURIA: 0
FEVER: 0
LIGHT-HEADEDNESS: 0
DIAPHORESIS: 0
DIZZINESS: 0
DIARRHEA: 0
HEMATURIA: 0
UNEXPECTED WEIGHT CHANGE: 0
VOMITING: 0
SHORTNESS OF BREATH: 0
CHILLS: 0
NAUSEA: 0
MYALGIAS: 0
CONSTIPATION: 0

## 2025-07-23 ASSESSMENT — PATIENT HEALTH QUESTIONNAIRE - PHQ9
1. LITTLE INTEREST OR PLEASURE IN DOING THINGS: NOT AT ALL
SUM OF ALL RESPONSES TO PHQ9 QUESTIONS 1 AND 2: 0
2. FEELING DOWN, DEPRESSED OR HOPELESS: NOT AT ALL

## 2025-07-23 NOTE — PROGRESS NOTES
"Subjective   Patient ID: Varghese Bethea" is a 69 y.o. male who presents for Pain (Patient is here for groin pain).  History of Present Illness  The patient presents for evaluation of groin pain.    Approximately 2-3 weeks ago, he began experiencing intermittent paresthesia in the lower abdomen and upper groin region, initially noted following swimming and showering. The episodes of sharp, transient pain range from a few seconds to 30 minutes in duration and are absent during nocturnal hours. There is no association with micturition or defecation, although the pain may be precipitated by standing or ambulation. He reports no pain since this morning. He attributes a mild stomachache in the lower abdomen to hunger secondary to reduced sugar intake. The pain is localized to the right groin without radiation to the scrotum or testes.    He denies experiencing fever, chills, diaphoresis, dyspnea, chest pain, nausea, vomiting, diarrhea, constipation, hematochezia, melena, hematemesis, altered thermoregulation, dysuria, scrotal swelling, testicular pain, penile pain, discharge, edema, hematuria, genital ulcers, or urinary urgency. Additionally, he reports no myalgia, skin rashes, lightheadedness, dizziness, syncope, visual or auditory disturbances, or inguinal hernias. There have been no unexpected weight changes aside from intentional weight loss. He experienced a flash of pain while carrying a metal table up a parking garage. He has been applying lotion to the affected area and takes tadalafil to reduce nocturnal urinary frequency.    The patient has a history of pemphigoid, for which he is under the care of dermatology and is prescribed dapsone. Recent He has a history of an adrenal nodule identified in 2020 and precancerous colon polyps, for which he undergoes regular screenings. He occasionally experiences indigestion and takes Prilosec as needed.    Social History:  Diet: He is reducing his sugar " intake.    Review of Systems   Constitutional:  Negative for chills, diaphoresis, fever and unexpected weight change.   HENT:  Negative for hearing loss.    Eyes:  Negative for visual disturbance.   Respiratory:  Negative for shortness of breath.    Cardiovascular:  Negative for chest pain.   Gastrointestinal:  Negative for constipation, diarrhea, nausea and vomiting.        No melena, hematemesis or coffee ground emesis.   Endocrine: Negative for cold intolerance and heat intolerance.   Genitourinary:  Negative for dysuria, genital sores, hematuria, penile discharge, penile pain, penile swelling, scrotal swelling, testicular pain and urgency.   Musculoskeletal:  Negative for myalgias.   Skin:  Negative for rash.   Neurological:  Negative for dizziness, syncope and light-headedness.       Objective     /82 (BP Location: Left arm, Patient Position: Sitting, BP Cuff Size: Adult)   Pulse 58   Temp 36.6 °C (97.8 °F) (Temporal)   Wt 97.6 kg (215 lb 3.2 oz)   SpO2 95%   BMI 28.39 kg/m²      Physical Exam  He politely declined chaperone.   GENERAL: Alert, cooperative, well developed, no acute distress, no diaphoresis.  CHEST: Clear to auscultation bilaterally, no wheezes, rhonchi, or rales.  CARDIOVASCULAR: Normal heart rate and rhythm, S1 and S2 normal without murmurs, rubs, or gallops.  ABDOMEN/: Soft, non-tender, non-distended, normal bowel sounds present in all four quadrants. No rebound or guarding. No scrotal swelling, testicular masses palpated and no inguinal hernias present.   EXTREMITIES: No cyanosis or edema.  NEUROLOGICAL: Moves all extremities without gross motor deficit.  PSYCH: Mood and affect normal.       Assessment & Plan  Groin pain  - Possible hernia;  - Hip arthritis considered but seems less likely due to inconsistent pattern  - Indigestion not related to current symptoms  - Order CT abdomen and pelvis with contrast to investigate structural issues or hernias  - Conduct urinalysis to rule  out urinary issues  - Refer to general surgeon for potential hernia correction  - Advise to avoid heavy lifting and squatting  - Inform me or Dr. Cesar if symptoms worsen or patterns emerge  - Seek immediate medical attention at ER if experiencing worsening pain, fevers, chills, sweats, or bulge that is stuck out or painful.   -F/u PCP as previously scheduled, sooner prn      Results         Angel Conner,      This medical note was created with the assistance of artificial intelligence (AI) for documentation purposes. The content has been reviewed and confirmed by the healthcare provider for accuracy and completeness. Patient consented to the use of audio recording and use of AI during their visit.

## 2025-07-24 LAB
APPEARANCE UR: CLEAR
BACTERIA #/AREA URNS HPF: ABNORMAL /HPF
BACTERIA UR CULT: ABNORMAL
BILIRUB UR QL STRIP: NEGATIVE
COLOR UR: YELLOW
GLUCOSE UR QL STRIP: NEGATIVE
HGB UR QL STRIP: NEGATIVE
HYALINE CASTS #/AREA URNS LPF: ABNORMAL /LPF
KETONES UR QL STRIP: NEGATIVE
LEUKOCYTE ESTERASE UR QL STRIP: NEGATIVE
NITRITE UR QL STRIP: NEGATIVE
PH UR STRIP: 6.5 [PH] (ref 5–8)
PROT UR QL STRIP: NEGATIVE
RBC #/AREA URNS HPF: ABNORMAL /HPF
SERVICE CMNT-IMP: ABNORMAL
SP GR UR STRIP: 1.01 (ref 1–1.03)
SQUAMOUS #/AREA URNS HPF: ABNORMAL /HPF
WBC #/AREA URNS HPF: ABNORMAL /HPF

## 2025-07-26 PROBLEM — R10.31 RIGHT GROIN PAIN: Status: ACTIVE | Noted: 2025-07-26

## 2025-07-30 ENCOUNTER — OFFICE VISIT (OUTPATIENT)
Dept: URGENT CARE | Age: 70
End: 2025-07-30
Payer: MEDICARE

## 2025-07-30 VITALS
OXYGEN SATURATION: 96 % | DIASTOLIC BLOOD PRESSURE: 82 MMHG | HEART RATE: 73 BPM | RESPIRATION RATE: 16 BRPM | SYSTOLIC BLOOD PRESSURE: 143 MMHG

## 2025-07-30 DIAGNOSIS — H10.023 OTHER MUCOPURULENT CONJUNCTIVITIS OF BOTH EYES: Primary | ICD-10-CM

## 2025-07-30 RX ORDER — CIPROFLOXACIN HYDROCHLORIDE 3 MG/ML
1 SOLUTION/ DROPS OPHTHALMIC EVERY 4 HOURS
Qty: 5 ML | Refills: 0 | Status: SHIPPED | OUTPATIENT
Start: 2025-07-30 | End: 2025-08-09

## 2025-07-30 ASSESSMENT — ENCOUNTER SYMPTOMS
PSYCHIATRIC NEGATIVE: 1
EYE REDNESS: 1
RESPIRATORY NEGATIVE: 1
HEMATOLOGIC/LYMPHATIC NEGATIVE: 1
CONSTITUTIONAL NEGATIVE: 1
ENDOCRINE NEGATIVE: 1
ALLERGIC/IMMUNOLOGIC NEGATIVE: 1
NEUROLOGICAL NEGATIVE: 1
CARDIOVASCULAR NEGATIVE: 1
GASTROINTESTINAL NEGATIVE: 1
EYE DISCHARGE: 1
EYE ITCHING: 1
MUSCULOSKELETAL NEGATIVE: 1

## 2025-07-30 ASSESSMENT — VISUAL ACUITY: OU: 1

## 2025-07-30 NOTE — PROGRESS NOTES
"Subjective   Patient ID: Varghese Morales \"Glen" is a 69 y.o. male. They present today with a chief complaint of Conjunctivitis (Bilateral pink eye x 4 days ).    History of Present Illness    History provided by:  Patient   used: No    Conjunctivitis  Location:  Bilateral eye discharge  Severity:  Moderate  Onset quality:  Gradual  Duration:  5 days  Timing:  Constant  Progression:  Worsening  Chronicity:  New      Past Medical History  Allergies as of 07/30/2025    (No Known Allergies)       Prescriptions Prior to Admission[1]     Medical History[2]    Surgical History[3]     reports that he has quit smoking. His smoking use included cigarettes. He has never used smokeless tobacco. He reports current alcohol use. He reports that he does not use drugs.    Review of Systems  Review of Systems   Constitutional: Negative.    HENT: Negative.     Eyes:  Positive for discharge, redness and itching.   Respiratory: Negative.     Cardiovascular: Negative.    Gastrointestinal: Negative.    Endocrine: Negative.    Genitourinary: Negative.    Musculoskeletal: Negative.    Skin: Negative.    Allergic/Immunologic: Negative.    Neurological: Negative.    Hematological: Negative.    Psychiatric/Behavioral: Negative.     All other systems reviewed and are negative.                                 Objective    Vitals:    07/30/25 0919   BP: 143/82   Pulse: 73   Resp: 16   SpO2: 96%     No LMP for male patient.    Physical Exam  Vitals and nursing note reviewed.   Constitutional:       Appearance: Normal appearance. He is normal weight.   HENT:      Head: Normocephalic.     Eyes:      General: Lids are normal. Lids are everted, no foreign bodies appreciated. Vision grossly intact. Gaze aligned appropriately.         Right eye: Discharge present.         Left eye: Discharge present.     Conjunctiva/sclera:      Right eye: Right conjunctiva is injected.      Left eye: Left conjunctiva is injected. "       Cardiovascular:      Rate and Rhythm: Normal rate and regular rhythm.   Pulmonary:      Effort: Pulmonary effort is normal.      Breath sounds: Normal breath sounds.   Abdominal:      Palpations: Abdomen is soft.     Skin:     General: Skin is warm and dry.     Neurological:      General: No focal deficit present.      Mental Status: He is alert and oriented to person, place, and time. Mental status is at baseline.     Psychiatric:         Mood and Affect: Mood normal.         Behavior: Behavior normal.         Thought Content: Thought content normal.         Judgment: Judgment normal.         Procedures    Point of Care Test & Imaging Results from this visit  No results found for this visit on 07/30/25.   Imaging  No results found.    Cardiology, Vascular, and Other Imaging  No other imaging results found for the past 2 days      Diagnostic study results (if any) were reviewed by JOIE Valle.    Assessment/Plan   Allergies, medications, history, and pertinent labs/EKGs/Imaging reviewed by JOIE Valle.     Medical Decision Making  Medical Decision Making  At time of discharge patient was clinically well-appearing and HDS for outpatient management. The patient and/or family was educated regarding diagnosis, supportive care, OTC and Rx medications. The patient and/or family was given the opportunity to ask questions prior to discharge.  They verbalized understanding of my discussion of the plans for treatment, expected course, indications to return to  or seek further evaluation in ED, and the need for timely follow up as directed.   They were provided with a work/school excuse if requested.        Orders and Diagnoses  Diagnoses and all orders for this visit:  Other mucopurulent conjunctivitis of both eyes  -     ciprofloxacin (Ciloxan) 0.3 % ophthalmic solution; Administer 1 drop into both eyes every 4 hours for 10 days.    Return to Urgent care if symptoms return or  progress  Follow up with PCP in 1-2 weeks     Patient disposition: Home    Electronically signed by JOIE Valle  9:40 AM           [1] (Not in a hospital admission)  [2]   Past Medical History:  Diagnosis Date    Abdominal distension (gaseous) 03/22/2017    Abdominal bloating    Encounter for removal of sutures 07/18/2021    Visit for suture removal    Essential (primary) hypertension 10/31/2022    Hypertension    Pain in left leg 07/27/2021    Pain of left lower extremity    Personal history of diseases of the skin and subcutaneous tissue 07/18/2021    History of cellulitis    Personal history of other diseases of the nervous system and sense organs 05/29/2015    History of tremor    Personal history of other diseases of the nervous system and sense organs 05/29/2015    History of tremor    Strain of other muscle(s) and tendon(s) at lower leg level, right leg, initial encounter 07/27/2016    Strain of calf muscle, right, initial encounter   [3]   Past Surgical History:  Procedure Laterality Date    COLONOSCOPY  12/05/2012    Complete Colonoscopy

## 2025-08-07 ENCOUNTER — HOSPITAL ENCOUNTER (OUTPATIENT)
Dept: RADIOLOGY | Facility: CLINIC | Age: 70
Discharge: HOME | End: 2025-08-07
Payer: MEDICARE

## 2025-08-07 DIAGNOSIS — R10.31 RIGHT GROIN PAIN: ICD-10-CM

## 2025-08-07 PROCEDURE — 74177 CT ABD & PELVIS W/CONTRAST: CPT

## 2025-08-07 PROCEDURE — 2550000001 HC RX 255 CONTRASTS: Performed by: STUDENT IN AN ORGANIZED HEALTH CARE EDUCATION/TRAINING PROGRAM

## 2025-08-07 RX ADMIN — IOHEXOL 75 ML: 350 INJECTION, SOLUTION INTRAVENOUS at 09:39

## 2025-08-08 ENCOUNTER — TELEPHONE (OUTPATIENT)
Dept: PRIMARY CARE | Facility: CLINIC | Age: 70
End: 2025-08-08
Payer: MEDICARE

## 2025-08-08 NOTE — TELEPHONE ENCOUNTER
He reports his abdominal pain is very minimal and feels pain for a few minutes intermittently and resolved. Pain is located around inner groin and mostly on right side. Still having regular bowel movements. No fever, chills, sweats. No pain now.  Patient will go to ER for worsening pain, any bulging, discoloration of the skin, fevers, chills, sweats.  Will reach out to the patient's surgeon that he has an appointment with for any further recommendations.  All questions answered.    Addendum: Called back patient from [facility], discussed ER precautions for worsening pain, bulging, swelling, skin discoloration, fever, chills, sweats.  Recommended avoiding weightbearing and activities that cause increase in intra-abdominal pressure or exertion.  Recommend to keep general surgery appointment.

## 2025-08-14 ENCOUNTER — OFFICE VISIT (OUTPATIENT)
Dept: SURGERY | Facility: CLINIC | Age: 70
End: 2025-08-14
Payer: MEDICARE

## 2025-08-14 VITALS
SYSTOLIC BLOOD PRESSURE: 149 MMHG | BODY MASS INDEX: 27.71 KG/M2 | DIASTOLIC BLOOD PRESSURE: 85 MMHG | OXYGEN SATURATION: 95 % | WEIGHT: 210 LBS | HEART RATE: 56 BPM

## 2025-08-14 DIAGNOSIS — K40.20 BILATERAL INGUINAL HERNIA WITHOUT OBSTRUCTION OR GANGRENE, RECURRENCE NOT SPECIFIED: Primary | ICD-10-CM

## 2025-08-14 PROCEDURE — 99203 OFFICE O/P NEW LOW 30 MIN: CPT | Performed by: SURGERY

## 2025-08-14 PROCEDURE — 1159F MED LIST DOCD IN RCRD: CPT | Performed by: SURGERY

## 2025-08-14 PROCEDURE — 1126F AMNT PAIN NOTED NONE PRSNT: CPT | Performed by: SURGERY

## 2025-08-14 PROCEDURE — 3079F DIAST BP 80-89 MM HG: CPT | Performed by: SURGERY

## 2025-08-14 PROCEDURE — 99213 OFFICE O/P EST LOW 20 MIN: CPT | Performed by: SURGERY

## 2025-08-14 PROCEDURE — 3077F SYST BP >= 140 MM HG: CPT | Performed by: SURGERY

## 2025-08-14 ASSESSMENT — PAIN SCALES - GENERAL: PAINLEVEL_OUTOF10: 0-NO PAIN

## 2025-08-18 ENCOUNTER — TELEPHONE (OUTPATIENT)
Dept: PRIMARY CARE | Facility: CLINIC | Age: 70
End: 2025-08-18
Payer: MEDICARE

## 2025-08-20 ENCOUNTER — OFFICE VISIT (OUTPATIENT)
Dept: PRIMARY CARE | Facility: CLINIC | Age: 70
End: 2025-08-20
Payer: MEDICARE

## 2025-08-20 VITALS
RESPIRATION RATE: 16 BRPM | DIASTOLIC BLOOD PRESSURE: 80 MMHG | BODY MASS INDEX: 27.97 KG/M2 | OXYGEN SATURATION: 98 % | SYSTOLIC BLOOD PRESSURE: 148 MMHG | TEMPERATURE: 97.9 F | WEIGHT: 212 LBS | HEART RATE: 57 BPM

## 2025-08-20 DIAGNOSIS — R10.31 RIGHT GROIN PAIN: Primary | ICD-10-CM

## 2025-08-20 DIAGNOSIS — K21.9 GASTROESOPHAGEAL REFLUX DISEASE WITHOUT ESOPHAGITIS: ICD-10-CM

## 2025-08-20 DIAGNOSIS — I10 PRIMARY HYPERTENSION: ICD-10-CM

## 2025-08-20 PROCEDURE — 3077F SYST BP >= 140 MM HG: CPT | Performed by: INTERNAL MEDICINE

## 2025-08-20 PROCEDURE — 3079F DIAST BP 80-89 MM HG: CPT | Performed by: INTERNAL MEDICINE

## 2025-08-20 PROCEDURE — 99214 OFFICE O/P EST MOD 30 MIN: CPT | Performed by: INTERNAL MEDICINE

## 2025-08-20 PROCEDURE — 1160F RVW MEDS BY RX/DR IN RCRD: CPT | Performed by: INTERNAL MEDICINE

## 2025-08-20 PROCEDURE — 1159F MED LIST DOCD IN RCRD: CPT | Performed by: INTERNAL MEDICINE

## 2025-08-20 PROCEDURE — G2211 COMPLEX E/M VISIT ADD ON: HCPCS | Performed by: INTERNAL MEDICINE

## 2025-08-20 RX ORDER — OMEPRAZOLE 20 MG/1
20 CAPSULE, DELAYED RELEASE ORAL DAILY
Qty: 90 CAPSULE | Refills: 1 | Status: SHIPPED | OUTPATIENT
Start: 2025-08-20 | End: 2026-05-17

## 2025-08-20 ASSESSMENT — ENCOUNTER SYMPTOMS
CONSTIPATION: 0
DIARRHEA: 0
FREQUENCY: 0
DYSURIA: 0
BLOOD IN STOOL: 0
ABDOMINAL PAIN: 1
ABDOMINAL DISTENTION: 1

## 2025-08-20 ASSESSMENT — PATIENT HEALTH QUESTIONNAIRE - PHQ9
SUM OF ALL RESPONSES TO PHQ9 QUESTIONS 1 AND 2: 0
1. LITTLE INTEREST OR PLEASURE IN DOING THINGS: NOT AT ALL
2. FEELING DOWN, DEPRESSED OR HOPELESS: NOT AT ALL

## 2025-08-22 PROBLEM — K40.20 BILATERAL INGUINAL HERNIA WITHOUT OBSTRUCTION OR GANGRENE: Status: ACTIVE | Noted: 2025-08-14

## 2025-09-30 ENCOUNTER — APPOINTMENT (OUTPATIENT)
Dept: UROLOGY | Facility: CLINIC | Age: 70
End: 2025-09-30
Payer: MEDICARE

## 2025-12-05 ENCOUNTER — APPOINTMENT (OUTPATIENT)
Dept: DERMATOLOGY | Facility: CLINIC | Age: 70
End: 2025-12-05
Payer: MEDICARE

## 2026-02-25 ENCOUNTER — APPOINTMENT (OUTPATIENT)
Dept: PRIMARY CARE | Facility: CLINIC | Age: 71
End: 2026-02-25
Payer: MEDICARE